# Patient Record
Sex: MALE | Race: WHITE | NOT HISPANIC OR LATINO | URBAN - METROPOLITAN AREA
[De-identification: names, ages, dates, MRNs, and addresses within clinical notes are randomized per-mention and may not be internally consistent; named-entity substitution may affect disease eponyms.]

---

## 2017-02-14 ENCOUNTER — OFFICE VISIT (OUTPATIENT)
Dept: RETAIL CLINIC | Facility: CLINIC | Age: 47
End: 2017-02-14

## 2017-02-14 VITALS
WEIGHT: 168 LBS | OXYGEN SATURATION: 99 % | HEART RATE: 76 BPM | DIASTOLIC BLOOD PRESSURE: 84 MMHG | RESPIRATION RATE: 14 BRPM | SYSTOLIC BLOOD PRESSURE: 122 MMHG | TEMPERATURE: 99.4 F

## 2017-02-14 DIAGNOSIS — J02.9 PHARYNGITIS, UNSPECIFIED ETIOLOGY: ICD-10-CM

## 2017-02-14 DIAGNOSIS — J11.1 INFLUENZA: Primary | ICD-10-CM

## 2017-02-14 LAB
EXPIRATION DATE: NORMAL
INTERNAL CONTROL: NORMAL
Lab: NORMAL
S PYO AG THROAT QL: NEGATIVE

## 2017-02-14 PROCEDURE — 87880 STREP A ASSAY W/OPTIC: CPT | Performed by: FAMILY MEDICINE

## 2017-02-14 PROCEDURE — 99213 OFFICE O/P EST LOW 20 MIN: CPT | Performed by: FAMILY MEDICINE

## 2017-02-14 RX ORDER — OMEPRAZOLE 20 MG/1
20 CAPSULE, DELAYED RELEASE ORAL DAILY
COMMUNITY
End: 2021-08-27

## 2017-02-14 NOTE — PROGRESS NOTES
"Subjective   Marcelino Ram is a 46 y.o. male presents for   Chief Complaint   Patient presents with   • Fever     RESOLVED LASTED 5 DAYS   • URI     6 DAYS   .     History of Present Illness    Marcelino started feeling sick last Wednesday (6 days ago).  He had body aches, chills, sweats, fever to 102, congestion, coughing, malaise, and fatigue.  \"I felt like I got run over by a truck.\"  He did not get a flu shot this year.  He did take some OTC ibuprofen and tylenol and that made him feel a little better.  Today is the first day he has not had a fever and felt a little better.  He says that his throat is still very sore and he was unable to swallow this morning because it hurt so bad.  That is feeling a little better now though.  Wife has also started to exhibit some of the same symptoms.  He believes that she did not get the flu shot either.    The following portions of the patient's history were reviewed and updated as appropriate: allergies, current medications, past family history, past medical history, past social history, past surgical history and problem list.    Review of Systems   Constitutional: Positive for activity change (decreased activity due to fatigue and malaise), chills, diaphoresis, fatigue and fever. Negative for appetite change and unexpected weight change.   HENT: Positive for congestion, postnasal drip, rhinorrhea, sinus pressure, sore throat and trouble swallowing (due to throat pain). Negative for drooling, ear discharge, ear pain, facial swelling, hearing loss, mouth sores, nosebleeds, sneezing and tinnitus.    Eyes: Negative.    Respiratory: Positive for cough. Negative for apnea, choking, chest tightness, shortness of breath, wheezing and stridor.    Cardiovascular: Negative.    Gastrointestinal: Negative.    Endocrine: Negative.    Genitourinary: Negative.    Musculoskeletal: Positive for arthralgias, back pain, myalgias and neck pain. Negative for gait problem, joint swelling and neck " stiffness.   Skin: Negative.    Allergic/Immunologic: Negative.    Neurological: Negative.    Hematological: Negative.    Psychiatric/Behavioral: Negative.        Objective   Vitals:    02/14/17 1034   BP: 122/84   Pulse: 76   Resp: 14   Temp: 99.4 °F (37.4 °C)   TempSrc: Oral   SpO2: 99%   Weight: 168 lb (76.2 kg)       Physical Exam   Constitutional: He is oriented to person, place, and time. He appears well-developed and well-nourished. No distress.   HENT:   Head: Normocephalic and atraumatic.   Right Ear: External ear normal.   Left Ear: External ear normal.   Nose: Nose normal.   Eyes: Conjunctivae and EOM are normal. Pupils are equal, round, and reactive to light. No scleral icterus.   Neck: Neck supple. No thyromegaly present.   Cardiovascular: Normal rate, regular rhythm, normal heart sounds and intact distal pulses.  Exam reveals no gallop and no friction rub.    No murmur heard.  Pulmonary/Chest: Effort normal and breath sounds normal. No respiratory distress. He has no wheezes. He has no rales.   Lymphadenopathy:     He has cervical adenopathy.   Neurological: He is alert and oriented to person, place, and time. No cranial nerve deficit.   Skin: Skin is warm and dry. No rash noted.   Psychiatric: He has a normal mood and affect. His behavior is normal.   Nursing note and vitals reviewed.    Lab Results   Component Value Date    RAPSCRN Negative 02/14/2017         Assessment/Plan   Marcelino was seen today for fever and uri.    Diagnoses and all orders for this visit:    Influenza   Symptom based.  Symptoms are resolving now.      Pharyngitis, unspecified etiology  -     POC Rapid Strep A-neg.  Likely due to drainage from recent flu.  Gave Mucinex 600 mg 1-2 po BID prn drainage.  Advised to increase water intake also.

## 2018-01-16 ENCOUNTER — OFFICE VISIT CONVERTED (OUTPATIENT)
Dept: SURGERY | Facility: CLINIC | Age: 48
End: 2018-01-16
Attending: NURSE PRACTITIONER

## 2018-02-28 ENCOUNTER — OFFICE VISIT CONVERTED (OUTPATIENT)
Dept: SURGERY | Facility: CLINIC | Age: 48
End: 2018-02-28
Attending: SURGERY

## 2019-01-07 ENCOUNTER — HOSPITAL ENCOUNTER (OUTPATIENT)
Dept: LAB | Facility: HOSPITAL | Age: 49
Discharge: HOME | End: 2019-01-07
Attending: PHYSICIAN ASSISTANT

## 2019-01-07 LAB
ALBUMIN SERPL-MCNC: 4.4 G/DL (ref 3.5–5)
ALBUMIN/GLOB SERPL: 1.6 {RATIO} (ref 1.4–2.6)
ALP SERPL-CCNC: 58 U/L (ref 53–128)
ALT SERPL-CCNC: 16 U/L (ref 10–40)
ANION GAP SERPL CALC-SCNC: 18 MMOL/L (ref 8–19)
AST SERPL-CCNC: 18 U/L (ref 15–50)
BASOPHILS # BLD AUTO: 0 10*3/UL (ref 0–0.2)
BASOPHILS NFR BLD AUTO: 0.03 % (ref 0–3)
BILIRUB SERPL-MCNC: 1.25 MG/DL (ref 0.2–1.3)
BUN SERPL-MCNC: 9 MG/DL (ref 5–25)
BUN/CREAT SERPL: 10 {RATIO} (ref 6–20)
CALCIUM SERPL-MCNC: 9.1 MG/DL (ref 8.7–10.4)
CHLORIDE SERPL-SCNC: 101 MMOL/L (ref 99–111)
CHOLEST SERPL-MCNC: 166 MG/DL (ref 107–200)
CHOLEST/HDLC SERPL: 4.7 {RATIO} (ref 3–6)
CONV CO2: 25 MMOL/L (ref 22–32)
CONV TOTAL PROTEIN: 7.2 G/DL (ref 6.3–8.2)
CREAT UR-MCNC: 0.94 MG/DL (ref 0.7–1.2)
EOSINOPHIL # BLD AUTO: 0.27 10*3/UL (ref 0–0.7)
EOSINOPHIL # BLD AUTO: 4.07 % (ref 0–7)
ERYTHROCYTE [DISTWIDTH] IN BLOOD BY AUTOMATED COUNT: 11.1 % (ref 11.5–14.5)
GFR SERPLBLD BASED ON 1.73 SQ M-ARVRAT: >60 ML/MIN/{1.73_M2}
GLOBULIN UR ELPH-MCNC: 2.8 G/DL (ref 2–3.5)
GLUCOSE SERPL-MCNC: 81 MG/DL (ref 70–99)
HBA1C MFR BLD: 15.4 G/DL (ref 14–18)
HCT VFR BLD AUTO: 43.6 % (ref 42–52)
HDLC SERPL-MCNC: 35 MG/DL (ref 40–60)
LDLC SERPL CALC-MCNC: 76 MG/DL (ref 70–100)
LYMPHOCYTES # BLD AUTO: 2.35 10*3/UL (ref 1–5)
MCH RBC QN AUTO: 31.4 PG (ref 27–31)
MCHC RBC AUTO-ENTMCNC: 35.3 G/DL (ref 33–37)
MCV RBC AUTO: 88.9 FL (ref 80–96)
MONOCYTES # BLD AUTO: 0.55 10*3/UL (ref 0.2–1.2)
MONOCYTES NFR BLD AUTO: 8.21 % (ref 3–10)
NEUTROPHILS # BLD AUTO: 3.49 10*3/UL (ref 2–8)
NEUTROPHILS NFR BLD AUTO: 52.4 % (ref 30–85)
NRBC BLD AUTO-RTO: 0 % (ref 0–0.01)
OSMOLALITY SERPL CALC.SUM OF ELEC: 288 MOSM/KG (ref 273–304)
PLATELET # BLD AUTO: 233 10*3/UL (ref 130–400)
PMV BLD AUTO: 8.2 FL (ref 7.4–10.4)
POTASSIUM SERPL-SCNC: 4.1 MMOL/L (ref 3.5–5.3)
RBC # BLD AUTO: 4.91 10*6/UL (ref 4.7–6.1)
SODIUM SERPL-SCNC: 140 MMOL/L (ref 135–147)
T4 FREE SERPL-MCNC: 1.5 NG/DL (ref 0.9–1.8)
TRIGL SERPL-MCNC: 276 MG/DL (ref 40–150)
TSH SERPL-ACNC: 0.88 M[IU]/L (ref 0.27–4.2)
VARIANT LYMPHS NFR BLD MANUAL: 35.3 % (ref 20–45)
VLDLC SERPL-MCNC: 55 MG/DL (ref 5–37)
WBC # BLD AUTO: 6.66 10*3/UL (ref 4.8–10.8)

## 2019-01-08 LAB — 25(OH)D3 SERPL-MCNC: 25.6 NG/ML (ref 30–100)

## 2019-01-21 ENCOUNTER — HOSPITAL ENCOUNTER (OUTPATIENT)
Dept: GENERAL RADIOLOGY | Facility: HOSPITAL | Age: 49
Discharge: HOME | End: 2019-01-21
Attending: PHYSICIAN ASSISTANT

## 2019-01-28 ENCOUNTER — OFFICE VISIT CONVERTED (OUTPATIENT)
Dept: ORTHOPEDIC SURGERY | Facility: CLINIC | Age: 49
End: 2019-01-28
Attending: ORTHOPAEDIC SURGERY

## 2019-02-11 ENCOUNTER — CONVERSION ENCOUNTER (OUTPATIENT)
Dept: SURGERY | Facility: CLINIC | Age: 49
End: 2019-02-11

## 2019-02-11 ENCOUNTER — OFFICE VISIT CONVERTED (OUTPATIENT)
Dept: UROLOGY | Facility: CLINIC | Age: 49
End: 2019-02-11
Attending: UROLOGY

## 2019-02-13 ENCOUNTER — OFFICE VISIT CONVERTED (OUTPATIENT)
Dept: SURGERY | Facility: CLINIC | Age: 49
End: 2019-02-13
Attending: NURSE PRACTITIONER

## 2019-02-14 ENCOUNTER — HOSPITAL ENCOUNTER (OUTPATIENT)
Dept: GENERAL RADIOLOGY | Facility: HOSPITAL | Age: 49
Discharge: HOME | End: 2019-02-14
Attending: UROLOGY

## 2019-07-10 ENCOUNTER — HOSPITAL ENCOUNTER (OUTPATIENT)
Dept: SURGERY | Facility: HOSPITAL | Age: 49
Setting detail: HOSPITAL OUTPATIENT SURGERY
Discharge: HOME | End: 2019-07-10
Attending: SURGERY

## 2019-07-31 ENCOUNTER — OFFICE VISIT CONVERTED (OUTPATIENT)
Dept: SURGERY | Facility: CLINIC | Age: 49
End: 2019-07-31
Attending: NURSE PRACTITIONER

## 2020-01-30 ENCOUNTER — HOSPITAL ENCOUNTER (OUTPATIENT)
Dept: LAB | Facility: HOSPITAL | Age: 50
Discharge: HOME | End: 2020-01-30
Attending: PHYSICIAN ASSISTANT

## 2020-01-30 LAB
25(OH)D3 SERPL-MCNC: 26.8 NG/ML (ref 30–100)
ALBUMIN SERPL-MCNC: 4.8 G/DL (ref 3.5–5)
ALBUMIN/GLOB SERPL: 1.7 {RATIO} (ref 1.4–2.6)
ALP SERPL-CCNC: 64 U/L (ref 53–128)
ALT SERPL-CCNC: 17 U/L (ref 10–40)
ANION GAP SERPL CALC-SCNC: 25 MMOL/L (ref 8–19)
AST SERPL-CCNC: 18 U/L (ref 15–50)
BASOPHILS # BLD AUTO: 0.06 10*3/UL (ref 0–0.2)
BASOPHILS NFR BLD AUTO: 0.8 % (ref 0–3)
BILIRUB SERPL-MCNC: 1.3 MG/DL (ref 0.2–1.3)
BUN SERPL-MCNC: 15 MG/DL (ref 5–25)
BUN/CREAT SERPL: 14 {RATIO} (ref 6–20)
CALCIUM SERPL-MCNC: 10.1 MG/DL (ref 8.7–10.4)
CHLORIDE SERPL-SCNC: 100 MMOL/L (ref 99–111)
CHOLEST SERPL-MCNC: 207 MG/DL (ref 107–200)
CHOLEST/HDLC SERPL: 6.5 {RATIO} (ref 3–6)
CONV ABS IMM GRAN: 0.06 10*3/UL (ref 0–0.2)
CONV CO2: 23 MMOL/L (ref 22–32)
CONV IMMATURE GRAN: 0.8 % (ref 0–1.8)
CONV TOTAL PROTEIN: 7.7 G/DL (ref 6.3–8.2)
CREAT UR-MCNC: 1.08 MG/DL (ref 0.7–1.2)
DEPRECATED RDW RBC AUTO: 38.3 FL (ref 35.1–43.9)
EOSINOPHIL # BLD AUTO: 0.23 10*3/UL (ref 0–0.7)
EOSINOPHIL # BLD AUTO: 3.2 % (ref 0–7)
ERYTHROCYTE [DISTWIDTH] IN BLOOD BY AUTOMATED COUNT: 11.7 % (ref 11.6–14.4)
GFR SERPLBLD BASED ON 1.73 SQ M-ARVRAT: >60 ML/MIN/{1.73_M2}
GLOBULIN UR ELPH-MCNC: 2.9 G/DL (ref 2–3.5)
GLUCOSE SERPL-MCNC: 93 MG/DL (ref 70–99)
HCT VFR BLD AUTO: 48.9 % (ref 42–52)
HDLC SERPL-MCNC: 32 MG/DL (ref 40–60)
HGB BLD-MCNC: 16.6 G/DL (ref 14–18)
LDLC SERPL CALC-MCNC: 112 MG/DL (ref 70–100)
LYMPHOCYTES # BLD AUTO: 2.48 10*3/UL (ref 1–5)
LYMPHOCYTES NFR BLD AUTO: 34 % (ref 20–45)
MCH RBC QN AUTO: 30.7 PG (ref 27–31)
MCHC RBC AUTO-ENTMCNC: 33.9 G/DL (ref 33–37)
MCV RBC AUTO: 90.6 FL (ref 80–96)
MONOCYTES # BLD AUTO: 0.73 10*3/UL (ref 0.2–1.2)
MONOCYTES NFR BLD AUTO: 10 % (ref 3–10)
NEUTROPHILS # BLD AUTO: 3.74 10*3/UL (ref 2–8)
NEUTROPHILS NFR BLD AUTO: 51.2 % (ref 30–85)
NRBC CBCN: 0 % (ref 0–0.7)
OSMOLALITY SERPL CALC.SUM OF ELEC: 297 MOSM/KG (ref 273–304)
PLATELET # BLD AUTO: 234 10*3/UL (ref 130–400)
PMV BLD AUTO: 11.1 FL (ref 9.4–12.4)
POTASSIUM SERPL-SCNC: 4.7 MMOL/L (ref 3.5–5.3)
PSA SERPL-MCNC: 1.6 NG/ML (ref 0–4)
RBC # BLD AUTO: 5.4 10*6/UL (ref 4.7–6.1)
SODIUM SERPL-SCNC: 143 MMOL/L (ref 135–147)
T4 FREE SERPL-MCNC: 1.2 NG/DL (ref 0.9–1.8)
TRIGL SERPL-MCNC: 498 MG/DL (ref 40–150)
TSH SERPL-ACNC: 1.14 M[IU]/L (ref 0.27–4.2)
WBC # BLD AUTO: 7.3 10*3/UL (ref 4.8–10.8)

## 2020-02-03 ENCOUNTER — HOSPITAL ENCOUNTER (OUTPATIENT)
Dept: GENERAL RADIOLOGY | Facility: HOSPITAL | Age: 50
Discharge: HOME | End: 2020-02-03
Attending: PHYSICIAN ASSISTANT

## 2020-04-17 ENCOUNTER — HOSPITAL ENCOUNTER (OUTPATIENT)
Dept: LAB | Facility: HOSPITAL | Age: 50
Discharge: HOME | End: 2020-04-17
Attending: PHYSICIAN ASSISTANT

## 2020-04-17 LAB
ALBUMIN SERPL-MCNC: 4.5 G/DL (ref 3.5–5)
ALBUMIN/GLOB SERPL: 1.7 {RATIO} (ref 1.4–2.6)
ALP SERPL-CCNC: 45 U/L (ref 53–128)
ALT SERPL-CCNC: 18 U/L (ref 10–40)
AMYLASE SERPL-CCNC: 39 U/L (ref 30–110)
ANION GAP SERPL CALC-SCNC: 17 MMOL/L (ref 8–19)
AST SERPL-CCNC: 19 U/L (ref 15–50)
BASOPHILS # BLD AUTO: 0.05 10*3/UL (ref 0–0.2)
BASOPHILS NFR BLD AUTO: 0.8 % (ref 0–3)
BILIRUB SERPL-MCNC: 0.98 MG/DL (ref 0.2–1.3)
BUN SERPL-MCNC: 13 MG/DL (ref 5–25)
BUN/CREAT SERPL: 12 {RATIO} (ref 6–20)
CALCIUM SERPL-MCNC: 9.4 MG/DL (ref 8.7–10.4)
CHLORIDE SERPL-SCNC: 101 MMOL/L (ref 99–111)
CONV ABS IMM GRAN: 0.03 10*3/UL (ref 0–0.2)
CONV CO2: 25 MMOL/L (ref 22–32)
CONV IMMATURE GRAN: 0.5 % (ref 0–1.8)
CONV TOTAL PROTEIN: 7.1 G/DL (ref 6.3–8.2)
CREAT UR-MCNC: 1.1 MG/DL (ref 0.7–1.2)
DEPRECATED RDW RBC AUTO: 38.3 FL (ref 35.1–43.9)
EOSINOPHIL # BLD AUTO: 0.45 10*3/UL (ref 0–0.7)
EOSINOPHIL # BLD AUTO: 7 % (ref 0–7)
ERYTHROCYTE [DISTWIDTH] IN BLOOD BY AUTOMATED COUNT: 11.7 % (ref 11.6–14.4)
GFR SERPLBLD BASED ON 1.73 SQ M-ARVRAT: >60 ML/MIN/{1.73_M2}
GLOBULIN UR ELPH-MCNC: 2.6 G/DL (ref 2–3.5)
GLUCOSE SERPL-MCNC: 89 MG/DL (ref 70–99)
HCT VFR BLD AUTO: 45.7 % (ref 42–52)
HGB BLD-MCNC: 15.4 G/DL (ref 14–18)
LIPASE SERPL-CCNC: 21 U/L (ref 5–51)
LYMPHOCYTES # BLD AUTO: 2.11 10*3/UL (ref 1–5)
LYMPHOCYTES NFR BLD AUTO: 32.7 % (ref 20–45)
MCH RBC QN AUTO: 30.4 PG (ref 27–31)
MCHC RBC AUTO-ENTMCNC: 33.7 G/DL (ref 33–37)
MCV RBC AUTO: 90.1 FL (ref 80–96)
MONOCYTES # BLD AUTO: 0.57 10*3/UL (ref 0.2–1.2)
MONOCYTES NFR BLD AUTO: 8.8 % (ref 3–10)
NEUTROPHILS # BLD AUTO: 3.25 10*3/UL (ref 2–8)
NEUTROPHILS NFR BLD AUTO: 50.2 % (ref 30–85)
NRBC CBCN: 0 % (ref 0–0.7)
OSMOLALITY SERPL CALC.SUM OF ELEC: 288 MOSM/KG (ref 273–304)
PLATELET # BLD AUTO: 273 10*3/UL (ref 130–400)
PMV BLD AUTO: 11 FL (ref 9.4–12.4)
POTASSIUM SERPL-SCNC: 4.3 MMOL/L (ref 3.5–5.3)
RBC # BLD AUTO: 5.07 10*6/UL (ref 4.7–6.1)
SODIUM SERPL-SCNC: 139 MMOL/L (ref 135–147)
WBC # BLD AUTO: 6.46 10*3/UL (ref 4.8–10.8)

## 2020-04-29 ENCOUNTER — HOSPITAL ENCOUNTER (OUTPATIENT)
Dept: NUCLEAR MEDICINE | Facility: HOSPITAL | Age: 50
Discharge: HOME | End: 2020-04-29
Attending: PHYSICIAN ASSISTANT

## 2020-05-01 ENCOUNTER — HOSPITAL ENCOUNTER (OUTPATIENT)
Dept: CT IMAGING | Facility: HOSPITAL | Age: 50
Discharge: HOME | End: 2020-05-01
Attending: PHYSICIAN ASSISTANT

## 2020-07-27 ENCOUNTER — HOSPITAL ENCOUNTER (OUTPATIENT)
Dept: LAB | Facility: HOSPITAL | Age: 50
Discharge: HOME | End: 2020-07-27
Attending: PHYSICIAN ASSISTANT

## 2020-07-28 LAB
ALBUMIN SERPL-MCNC: 4.4 G/DL (ref 3.5–5)
ALBUMIN/GLOB SERPL: 1.8 {RATIO} (ref 1.4–2.6)
ALP SERPL-CCNC: 61 U/L (ref 53–128)
ALT SERPL-CCNC: 14 U/L (ref 10–40)
ANION GAP SERPL CALC-SCNC: 17 MMOL/L (ref 8–19)
AST SERPL-CCNC: 20 U/L (ref 15–50)
BILIRUB SERPL-MCNC: 1.09 MG/DL (ref 0.2–1.3)
BUN SERPL-MCNC: 10 MG/DL (ref 5–25)
BUN/CREAT SERPL: 9 {RATIO} (ref 6–20)
CALCIUM SERPL-MCNC: 9.7 MG/DL (ref 8.7–10.4)
CHLORIDE SERPL-SCNC: 102 MMOL/L (ref 99–111)
CHOLEST SERPL-MCNC: 190 MG/DL (ref 107–200)
CHOLEST/HDLC SERPL: 5 {RATIO} (ref 3–6)
CONV CO2: 25 MMOL/L (ref 22–32)
CONV TOTAL PROTEIN: 6.9 G/DL (ref 6.3–8.2)
CREAT UR-MCNC: 1.1 MG/DL (ref 0.7–1.2)
GFR SERPLBLD BASED ON 1.73 SQ M-ARVRAT: >60 ML/MIN/{1.73_M2}
GLOBULIN UR ELPH-MCNC: 2.5 G/DL (ref 2–3.5)
GLUCOSE SERPL-MCNC: 86 MG/DL (ref 70–99)
HDLC SERPL-MCNC: 38 MG/DL (ref 40–60)
LDLC SERPL CALC-MCNC: 87 MG/DL (ref 70–100)
OSMOLALITY SERPL CALC.SUM OF ELEC: 286 MOSM/KG (ref 273–304)
POTASSIUM SERPL-SCNC: 4.6 MMOL/L (ref 3.5–5.3)
SODIUM SERPL-SCNC: 139 MMOL/L (ref 135–147)
TRIGL SERPL-MCNC: 323 MG/DL (ref 40–150)
VLDLC SERPL-MCNC: 65 MG/DL (ref 5–37)

## 2020-08-13 ENCOUNTER — OFFICE VISIT CONVERTED (OUTPATIENT)
Dept: SURGERY | Facility: CLINIC | Age: 50
End: 2020-08-13
Attending: NURSE PRACTITIONER

## 2021-02-02 ENCOUNTER — HOSPITAL ENCOUNTER (OUTPATIENT)
Dept: LAB | Facility: HOSPITAL | Age: 51
Discharge: HOME | End: 2021-02-02
Attending: PHYSICIAN ASSISTANT

## 2021-02-02 LAB
25(OH)D3 SERPL-MCNC: 59 NG/ML (ref 30–100)
ALBUMIN SERPL-MCNC: 4.6 G/DL (ref 3.5–5)
ALBUMIN/GLOB SERPL: 1.6 {RATIO} (ref 1.4–2.6)
ALP SERPL-CCNC: 68 U/L (ref 53–128)
ALT SERPL-CCNC: 15 U/L (ref 10–40)
ANION GAP SERPL CALC-SCNC: 19 MMOL/L (ref 8–19)
AST SERPL-CCNC: 17 U/L (ref 15–50)
BASOPHILS # BLD AUTO: 0.05 10*3/UL (ref 0–0.2)
BASOPHILS NFR BLD AUTO: 0.8 % (ref 0–3)
BILIRUB SERPL-MCNC: 0.81 MG/DL (ref 0.2–1.3)
BUN SERPL-MCNC: 15 MG/DL (ref 5–25)
BUN/CREAT SERPL: 14 {RATIO} (ref 6–20)
CALCIUM SERPL-MCNC: 9.5 MG/DL (ref 8.7–10.4)
CHLORIDE SERPL-SCNC: 102 MMOL/L (ref 99–111)
CHOLEST SERPL-MCNC: 195 MG/DL (ref 107–200)
CHOLEST/HDLC SERPL: 5.3 {RATIO} (ref 3–6)
CONV ABS IMM GRAN: 0.06 10*3/UL (ref 0–0.2)
CONV CO2: 26 MMOL/L (ref 22–32)
CONV IMMATURE GRAN: 0.9 % (ref 0–1.8)
CONV TOTAL PROTEIN: 7.4 G/DL (ref 6.3–8.2)
CREAT UR-MCNC: 1.09 MG/DL (ref 0.7–1.2)
DEPRECATED RDW RBC AUTO: 38.2 FL (ref 35.1–43.9)
EOSINOPHIL # BLD AUTO: 0.21 10*3/UL (ref 0–0.7)
EOSINOPHIL # BLD AUTO: 3.3 % (ref 0–7)
ERYTHROCYTE [DISTWIDTH] IN BLOOD BY AUTOMATED COUNT: 11.9 % (ref 11.6–14.4)
GFR SERPLBLD BASED ON 1.73 SQ M-ARVRAT: >60 ML/MIN/{1.73_M2}
GLOBULIN UR ELPH-MCNC: 2.8 G/DL (ref 2–3.5)
GLUCOSE SERPL-MCNC: 91 MG/DL (ref 70–99)
HCT VFR BLD AUTO: 46.3 % (ref 42–52)
HDLC SERPL-MCNC: 37 MG/DL (ref 40–60)
HGB BLD-MCNC: 16.2 G/DL (ref 14–18)
LDLC SERPL CALC-MCNC: 108 MG/DL (ref 70–100)
LYMPHOCYTES # BLD AUTO: 2.05 10*3/UL (ref 1–5)
LYMPHOCYTES NFR BLD AUTO: 32 % (ref 20–45)
MCH RBC QN AUTO: 31 PG (ref 27–31)
MCHC RBC AUTO-ENTMCNC: 35 G/DL (ref 33–37)
MCV RBC AUTO: 88.5 FL (ref 80–96)
MONOCYTES # BLD AUTO: 0.58 10*3/UL (ref 0.2–1.2)
MONOCYTES NFR BLD AUTO: 9 % (ref 3–10)
NEUTROPHILS # BLD AUTO: 3.46 10*3/UL (ref 2–8)
NEUTROPHILS NFR BLD AUTO: 54 % (ref 30–85)
NRBC CBCN: 0 % (ref 0–0.7)
OSMOLALITY SERPL CALC.SUM OF ELEC: 294 MOSM/KG (ref 273–304)
PLATELET # BLD AUTO: 215 10*3/UL (ref 130–400)
PMV BLD AUTO: 11 FL (ref 9.4–12.4)
POTASSIUM SERPL-SCNC: 4.5 MMOL/L (ref 3.5–5.3)
PSA SERPL-MCNC: 1.78 NG/ML (ref 0–4)
RBC # BLD AUTO: 5.23 10*6/UL (ref 4.7–6.1)
SODIUM SERPL-SCNC: 142 MMOL/L (ref 135–147)
T4 FREE SERPL-MCNC: 1.3 NG/DL (ref 0.9–1.8)
TRIGL SERPL-MCNC: 419 MG/DL (ref 40–150)
TSH SERPL-ACNC: 1.02 M[IU]/L (ref 0.27–4.2)
WBC # BLD AUTO: 6.41 10*3/UL (ref 4.8–10.8)

## 2021-02-05 ENCOUNTER — HOSPITAL ENCOUNTER (OUTPATIENT)
Dept: CARDIOLOGY | Facility: HOSPITAL | Age: 51
Discharge: HOME | End: 2021-02-05
Attending: PHYSICIAN ASSISTANT

## 2021-03-04 ENCOUNTER — HOSPITAL ENCOUNTER (OUTPATIENT)
Dept: NUCLEAR MEDICINE | Facility: HOSPITAL | Age: 51
Discharge: HOME | End: 2021-03-04
Attending: PHYSICIAN ASSISTANT

## 2021-05-10 NOTE — H&P
History and Physical      Patient Name: Marcelino Ram   Patient ID: [id number]   Sex: Male   YOB: 1970    Primary Care Provider: Edwina Anders PA-C   Referring Provider: Edwina Anders PA-C    Visit Date: August 13, 2020    Provider: ISSAC Acosta   Location[facility]   Location Address: [address] [place] [address]  [id number]   Location Phone: [phone]          Chief Complaint  · Requesting EGD  · Orestes's Esophagus   · GERD      History Of Present Illness  The patient is a 50 year old /White male presenting to the [facility] office on a referral from Sanford Segal MD.   Marcelino Ram needs to have a diagnostic EGD.   Patient states that they have had a colonoscopy. 1 year ago   Patient currently complains of: GERD and Hx of siegel's   Patient Does not have family history of colon cancer.      Patient presents today on a referral from Dr. Sanford Segal. Patient reports GERD symptoms and a history of Siegel's esophagus. Patient denies any breakthrough symptoms at night. Denies any dysphagia or abdominal pain. Admits to taking omeprazole daily now.     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     2/2018 - EGD & Colonoscopy ([facility]): hiatal hernia; grade III esophagitis; diverticulosis; Hemorrhoids.       Past Medical History  Disease Name Date Onset Notes   Siegel esophagus --  --    Epididymal cyst --  --    GERD --  --    High blood pressure --  --    High cholesterol --  --    Hydrocele --  --    Hypertension --  --    Left Tibial Osteophyte 01/30/2019 --    Limb Swelling --  --    Reflux --  --    Seasonal allergies --  --          Past Surgical History  Procedure Name Date Notes   Colonoscopy --  --    EGD (Endoscopy) --  --    Joint Surgery --  --    Knee surgery --  right knee         Medication List  Name Date Started Instructions    metoprolol succinate 25 mg oral tablet extended release 24 hr  take 1-2 tablets by oral route daily   omeprazole 40 mg oral capsule,delayed release(/EC) 09/10/2019 take 1 capsule (40 mg) by oral route once daily before a meal for 30 days   Vitamin D3 oral  --          Allergy List  Allergen Name Date Reaction Notes   NO KNOWN DRUG ALLERGIES --  --  --          Family Medical History  Disease Name Relative/Age Notes   Heart Disease Brother/  Sister/   Sister; Brother   Cancer, Unspecified Father/   Father   - No Family History of Colorectal Cancer  --    Renal Cancer Father/   Father         Social History  Finding Status Start/Stop Quantity Notes   Alcohol Current some day --/-- --  drinks weekly; beer  6-12 drinks a week   Alcohol Use Current some day --/-- --  drinks weekly, 1 drink per day, has been drinking for 11-20 years   Caffeine Current every day --/-- --  drinks regularly; coffee and tea; 1-2 times per day   lives with spouse --  --/-- --  --    . --  --/-- --  --    Recreational Drug Use Never --/-- --  no   Second hand smoke exposure Never --/-- --  no   Tobacco Never --/-- --  never smoker  never a smoker   Working --  --/-- --  --          Review of Systems  · Constitutional  o Denies  o : fever, chills  · Eyes  o Denies  o : yellowish discoloration of eyes  · HENT  o Denies  o : difficulty swallowing  · Cardiovascular  o Denies  o : chest pain, chest pain on exertion  · Respiratory  o Denies  o : shortness of breath  · Gastrointestinal  o Admits  o : heartburn, reflux  o Denies  o : nausea, vomiting, diarrhea, constipation  · Genitourinary  o Denies  o : abnormal color of urine  · Integument  o Denies  o : rash  · Neurologic  o Denies  o : tingling or numbness  · Musculoskeletal  o Denies  o : joint pain  · Endocrine  o Denies  o : weight gain, weight loss      Vitals  Date Time BP Position Site L\R Cuff Size HR RR TEMP (F) WT  HT  BMI kg/m2 BSA m2 O2 Sat        08/13/2020 09:30 AM       " 14  178lbs 4oz 5'  8\" 27.1 1.97           Physical Examination  · Constitutional  o Appearance  o : well developed, well-nourished, patient in no apparent distress  · Head and Face  o Head  o :   § Inspection  § : atraumatic, normocephalic  o Face  o :   § Inspection  § : no facial lesions  · Eyes  o Conjunctivae  o : conjunctivae normal  o Sclerae  o : sclerae white  · Neck  o Inspection/Palpation  o : normal appearance, no masses or tenderness, trachea midline  · Respiratory  o Respiratory Effort  o : breathing unlabored  · Skin and Subcutaneous Tissue  o General Inspection  o : no lesions present, no areas of discoloration, skin turgor normal, texture normal  · Neurologic  o Mental Status Examination  o :   § Orientation  § : grossly oriented to person, place and time  § Attention  § : attention normal, concentration abilities normal  § Fund of Knowledge  § : fund of knowledge within normal limits, patient aware of current events  o Gait and Station  o : normal gait, able to stand without difficulty  · Psychiatric  o Judgement and Insight  o : judgment and insight intact  o Mood and Affect  o : mood normal, affect appropriate              Assessment  · GERD (gastroesophageal reflux disease)     530.81/K21.9  · History of Hicks's esophagus     V12.79/Z87.19    Problems Reconciled  Plan  · Medications  o Medications have been Reconciled  o Transition of Care or Provider Policy  · Instructions  o Patient refusing EGD at this time. He does not want to be COVID tested. He prefers to wait until next year for EGD. Education provided on the importance of proceeding with EGD. Patient verbalizes understanding. Will recall patient in 1 year.   o Electronically Identified Patient Education Materials Provided Electronically  · Disposition  o Call or Return if symptoms worsen or persist.            Electronically Signed by: Jadyn Damian APRN -Author on August 13, 2020 03:51:00 PM  "

## 2021-05-15 VITALS — HEIGHT: 68 IN | BODY MASS INDEX: 27.01 KG/M2 | WEIGHT: 178.25 LBS | RESPIRATION RATE: 14 BRPM

## 2021-05-15 VITALS — WEIGHT: 177.37 LBS | RESPIRATION RATE: 14 BRPM | BODY MASS INDEX: 26.88 KG/M2 | HEIGHT: 68 IN

## 2021-05-16 VITALS — OXYGEN SATURATION: 97 % | BODY MASS INDEX: 26.58 KG/M2 | HEIGHT: 68 IN | WEIGHT: 175.37 LBS | HEART RATE: 74 BPM

## 2021-05-16 VITALS — BODY MASS INDEX: 26.52 KG/M2 | RESPIRATION RATE: 18 BRPM | WEIGHT: 175 LBS | HEIGHT: 68 IN

## 2021-05-16 VITALS
BODY MASS INDEX: 26.22 KG/M2 | WEIGHT: 173 LBS | DIASTOLIC BLOOD PRESSURE: 78 MMHG | HEIGHT: 68 IN | SYSTOLIC BLOOD PRESSURE: 122 MMHG

## 2021-05-16 VITALS — BODY MASS INDEX: 26.52 KG/M2 | WEIGHT: 175 LBS | RESPIRATION RATE: 14 BRPM | HEIGHT: 68 IN

## 2021-05-16 VITALS — WEIGHT: 175 LBS | HEART RATE: 75 BPM | HEIGHT: 68 IN | BODY MASS INDEX: 26.52 KG/M2

## 2021-08-15 PROBLEM — E55.9 VITAMIN D DEFICIENCY: Status: ACTIVE | Noted: 2021-08-15

## 2021-08-15 PROBLEM — K22.719 BARRETT'S ESOPHAGUS WITH DYSPLASIA: Status: ACTIVE | Noted: 2021-08-15

## 2021-08-15 PROBLEM — I10 HYPERTENSION: Status: ACTIVE | Noted: 2021-08-15

## 2021-08-15 PROBLEM — Z00.00 WELL ADULT HEALTH CHECK: Status: ACTIVE | Noted: 2021-08-15

## 2021-08-15 PROBLEM — E78.2 MIXED HYPERLIPIDEMIA: Status: ACTIVE | Noted: 2021-08-15

## 2021-08-15 PROBLEM — J30.2 SEASONAL ALLERGIC RHINITIS: Status: ACTIVE | Noted: 2021-08-15

## 2021-08-15 PROBLEM — N50.3 EPIDIDYMAL CYST: Status: ACTIVE | Noted: 2021-08-15

## 2021-08-15 PROBLEM — M25.70 OSTEOPHYTE: Status: ACTIVE | Noted: 2019-01-30

## 2021-08-15 PROBLEM — K22.70 BARRETT ESOPHAGUS: Status: ACTIVE | Noted: 2021-08-15

## 2021-08-15 PROBLEM — N43.3 HYDROCELE: Status: ACTIVE | Noted: 2021-08-15

## 2021-08-25 NOTE — PROGRESS NOTES
"Chief Complaint  Hypertension and Follow-up (Patient states that his throat has been bothering him, but he is over due for an EGD)    Subjective          Marcelino Ram presents to [facility]     History of Present Illness     Patient is a 51-year-old male, former patient of Edwina Covarrubias, who is seeing me for the first time.  Looks like he has underlying hyperlipidemia, hypertension, as well as a history of Hicks's esophagus.  I will review any labs that may have been performed for this visit, and address any new concerns.    Review of Systems   Constitutional: Negative for appetite change, fatigue and fever.   HENT: Negative for congestion and ear pain.    Eyes: Negative for blurred vision.   Respiratory: Negative for cough, chest tightness, shortness of breath and wheezing.    Cardiovascular: Negative for chest pain, palpitations and leg swelling.   Gastrointestinal: Negative for abdominal pain.   Genitourinary: Negative for difficulty urinating, dysuria and hematuria.   Musculoskeletal: Negative for arthralgias and gait problem.   Skin: Negative for skin lesions.   Neurological: Negative for syncope, memory problem and confusion.   Psychiatric/Behavioral: Negative for self-injury and depressed mood.       Objective   Vital Signs:   /84   Pulse 78   Temp 97.7 °F (36.5 °C)   Ht 172.7 cm (68\")   Wt 83 kg (183 lb)   SpO2 98%   BMI 27.83 kg/m²           Physical Exam  Vitals and nursing note reviewed.   Constitutional:       General: He is not in acute distress.     Appearance: Normal appearance. He is not toxic-appearing.   HENT:      Head: Atraumatic.      Right Ear: External ear normal.      Left Ear: External ear normal.      Nose: Nose normal.      Mouth/Throat:      Mouth: Mucous membranes are moist.   Eyes:      General:         Right eye: No discharge.         Left eye: No discharge.      Extraocular Movements: Extraocular movements intact.      Pupils: " Pupils are equal, round, and reactive to light.   Cardiovascular:      Rate and Rhythm: Normal rate and regular rhythm.      Pulses: Normal pulses.      Heart sounds: Normal heart sounds. No murmur heard.   No gallop.    Pulmonary:      Effort: Pulmonary effort is normal. No respiratory distress.      Breath sounds: No wheezing, rhonchi or rales.   Abdominal:      General: There is no distension.      Palpations: Abdomen is soft. There is no mass.      Tenderness: There is no abdominal tenderness. There is no guarding.   Musculoskeletal:         General: No swelling or tenderness.      Cervical back: No tenderness.      Right lower leg: No edema.      Left lower leg: No edema.   Skin:     General: Skin is warm and dry.      Findings: No rash.   Neurological:      General: No focal deficit present.      Mental Status: He is alert and oriented to person, place, and time. Mental status is at baseline.      Motor: No weakness.      Gait: Gait normal.   Psychiatric:         Mood and Affect: Mood normal.         Thought Content: Thought content normal.          Result Review :   The following data was reviewed by: Chris Jean MD on 08/18/2021:                  Assessment and Plan    Diagnoses and all orders for this visit:    1. Vitamin D deficiency (Primary)  Overview:  Repeat level on return to office.  Patient is compliant with over-the-counter supplementation.    Orders:  -     Vitamin D 1,25 Dihydroxy; Future    2. Mixed hyperlipidemia  Overview:  LDL was around 100 earlier this year.  However triglycerides had trended up to 400+.  Patient has been on fenofibrate, he is run out here lately, we will refill this, and repeat labs later this year or early next.    Orders:  -     Comprehensive Metabolic Panel; Future  -     Lipid Panel; Future    3. Essential hypertension  Overview:  Blood pressure is fine on low-dose beta-blocker only.  Like he is mainly on the beta-blocker for some palpitations, history of PACs.   Continue to follow.  Continue same med.      4. Hicks's esophagus with dysplasia  Overview:  Patient is on chronic PPI for this.  His last upper endoscopy was in July 2019 by Dr. Segal.  I recommend we get him in with gastroenterology going forward from this regards at least.    Orders:  -     Ambulatory Referral to Gastroenterology    5. Well adult health check  Overview:  Preventive care exam was performed 2/2/2021.    Orders:  -     CBC & Differential; Future  -     TSH+Free T4; Future  -     Urinalysis With Culture If Indicated -; Future    6. Prostate cancer screening  -     PSA Screen; Future    Other orders  -     fenofibrate (Tricor) 145 MG tablet; Take 1 tablet by mouth Daily.  Dispense: 90 tablet; Refill: 3       VISIT 2/2/21 per Edwina:    Left shoulder pain  Abnl lesion on tongue - will show dentist again  Left knee pain  HTN   Diverticulosis with hx of diverticulitis  Vitamin D Deficiency  Hypertriglyceridemia - hasn't taken Fenofibrate x 1 month - has forgotten  Hicks's esophagus - on Protonix now, will repeat EGD in 1 year - due 7/2020  Scrotal Hydrocele/cysts - saw Dr. Berry, no further work up  Hiatal Hernia  --  --  Also sees: no one  --  --  Stress test: 7/2015 per Dr. Mejía - sending for another--->had this in 3/4/21  IMPRESSION:    1.  Normal myocardial SPECT perfusion study with exercise.    2.  No evidence of myocardial infarct or reversible ischemia.    3.  Normal left ventricular systolic function with ejection fraction of 65%      Colonoscopy: 7/2019 per Dr. 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  Patient was  given instructions and counseling regarding his condition or for health maintenance advice. Please see specific information pulled into the AVS if appropriate.

## 2021-08-27 ENCOUNTER — OFFICE VISIT (OUTPATIENT)
Dept: INTERNAL MEDICINE | Facility: CLINIC | Age: 51
End: 2021-08-27

## 2021-08-27 VITALS
BODY MASS INDEX: 27.74 KG/M2 | TEMPERATURE: 97.7 F | DIASTOLIC BLOOD PRESSURE: 84 MMHG | OXYGEN SATURATION: 98 % | HEART RATE: 78 BPM | HEIGHT: 68 IN | SYSTOLIC BLOOD PRESSURE: 131 MMHG | WEIGHT: 183 LBS

## 2021-08-27 DIAGNOSIS — E78.2 MIXED HYPERLIPIDEMIA: ICD-10-CM

## 2021-08-27 DIAGNOSIS — K22.719 BARRETT'S ESOPHAGUS WITH DYSPLASIA: ICD-10-CM

## 2021-08-27 DIAGNOSIS — Z12.5 PROSTATE CANCER SCREENING: ICD-10-CM

## 2021-08-27 DIAGNOSIS — E55.9 VITAMIN D DEFICIENCY: Primary | ICD-10-CM

## 2021-08-27 DIAGNOSIS — I10 ESSENTIAL HYPERTENSION: ICD-10-CM

## 2021-08-27 DIAGNOSIS — Z00.00 WELL ADULT HEALTH CHECK: ICD-10-CM

## 2021-08-27 PROCEDURE — 99214 OFFICE O/P EST MOD 30 MIN: CPT | Performed by: INTERNAL MEDICINE

## 2021-08-27 RX ORDER — OMEPRAZOLE 40 MG/1
40 CAPSULE, DELAYED RELEASE ORAL DAILY
Qty: 90 CAPSULE | Refills: 1 | Status: SHIPPED | OUTPATIENT
Start: 2021-08-27 | End: 2021-09-29 | Stop reason: SDUPTHER

## 2021-08-27 RX ORDER — FENOFIBRATE 145 MG/1
145 TABLET, COATED ORAL DAILY
Qty: 90 TABLET | Refills: 3 | Status: SHIPPED | OUTPATIENT
Start: 2021-08-27 | End: 2022-03-01 | Stop reason: SDUPTHER

## 2021-08-27 RX ORDER — OMEPRAZOLE 40 MG/1
40 CAPSULE, DELAYED RELEASE ORAL DAILY
COMMUNITY
End: 2021-08-27 | Stop reason: SDUPTHER

## 2021-09-10 ENCOUNTER — TELEPHONE (OUTPATIENT)
Dept: INTERNAL MEDICINE | Facility: CLINIC | Age: 51
End: 2021-09-10

## 2021-09-10 RX ORDER — METOPROLOL SUCCINATE 25 MG/1
25 TABLET, EXTENDED RELEASE ORAL DAILY
Qty: 90 TABLET | Refills: 1 | Status: SHIPPED | OUTPATIENT
Start: 2021-09-10 | End: 2022-09-08 | Stop reason: SDUPTHER

## 2021-09-10 NOTE — TELEPHONE ENCOUNTER
Pt states that you sent in Metoprolol 25mg ER Tartrate, he had always gotten the ER succinate. He wanted to make sure that this change was ok. Please advise.

## 2021-09-10 NOTE — TELEPHONE ENCOUNTER
Please let the patient know that the metoprolol succinate was sent over to his pharmacy.  The metoprolol tartrate must have been what was in his chart, and I just refilled that.  We did not mean to make that change, so I agree with him staying on the metoprolol succinate.

## 2021-09-29 RX ORDER — OMEPRAZOLE 40 MG/1
40 CAPSULE, DELAYED RELEASE ORAL DAILY
Qty: 90 CAPSULE | Refills: 1 | Status: SHIPPED | OUTPATIENT
Start: 2021-09-29 | End: 2022-10-19

## 2021-11-23 NOTE — PROGRESS NOTES
Chief Complaint        Dysphagia, and Hicks's esophagus     History of Present Illness      Marcelino Ram is a 51 y.o. male who presents to [facility] as a new patient with a history of dysphagia and Hicks's esophagus.  Patient reports that his reflux is well controlled on omeprazole 40 mg.  His last EGD was in 2019 which displayed Hicks's esophagus and gastritis performed with Dr. Segal he was supposed to have a repeat EGD in 2020 but was unable to do so.  Patient reports that he feels like there is something stuck in his throat all the time.  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  He denies use of NSAIDs.  He denies family history of gastric cancer or colorectal cancer.  Patient reports no changes in his bowel movements.  Patient denies fever, nausea, vomiting, weight loss, night sweats, melena, hematochezia, hematemesis.    Endoscopy: Review of the patient's most recent EGD and colonoscopy performed by Dr. Segal on 07/10/2019 revealed Hicks's esophagus and gastritis.  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  Reflux esophagitis.  Negative for H. pylori.  Patient be due for repeat colonoscopy      Results       Result Review :   The following data was reviewed by: Kristen Simon NP on 11/29/2021     CMP    CMP 2/2/21   Glucose 91   BUN 15   Creatinine 1.09   Sodium 142   Potassium 4.5   Chloride 102   Calcium 9.5   Albumin 4.6   Total Bilirubin 0.81   Alkaline Phosphatase 68   AST (SGOT) 17   ALT (SGPT) 15           CBC    CBC 2/2/21   WBC 6.41   RBC 5.23   Hemoglobin 16.2   Hematocrit 46.3   MCV 88.5   MCH 31.0   MCHC 35.0   RDW 11.9   Platelets 215                  Past Medical History       Past Medical History:   Diagnosis Date   • Angioneurotic edema, initial encounter    • Anxiety disorder, unspecified    • APC (adenomatous  "polyposis coli)    • Hicks esophagus    • Cough    • Diverticulosis    • Esophageal reflux    • Essential (primary) hypertension    • Fracture of unsp metatarsal bone(s), left foot, init    • GERD (gastroesophageal reflux disease)    • Hyperlipidemia, unspecified    • Hypertension    • Multiple fractures of fingers    • Neural foraminal stenosis of cervical spine    • Other chest pain    • Other somatoform disorders    • PAC (premature atrial contraction)    • Pain in unspecified knee    • Palpitations    • Pneumonia    • Routine general medical examination at health care facility    • Vitamin D deficiency, unspecified        Past Surgical History:   Procedure Laterality Date   • COLONOSCOPY  07/2019   • KNEE ARTHROSCOPY Right    • NOSE SURGERY      NOSE FRACTURE   • UPPER GASTROINTESTINAL ENDOSCOPY           Current Outpatient Medications:   •  fenofibrate (Tricor) 145 MG tablet, Take 1 tablet by mouth Daily., Disp: 90 tablet, Rfl: 3  •  metoprolol succinate XL (Toprol XL) 25 MG 24 hr tablet, Take 1 tablet by mouth Daily., Disp: 90 tablet, Rfl: 1  •  omeprazole (priLOSEC) 40 MG capsule, Take 1 capsule by mouth Daily., Disp: 90 capsule, Rfl: 1     No Known Allergies    Family History   Problem Relation Age of Onset   • Cancer Father    • Heart disease Sister    • Stroke Sister    • Heart disease Brother    • Colon cancer Neg Hx         Social History     Social History Narrative   • Not on file       Objective       Objective     Vital Signs:   /77 (BP Location: Right arm, Patient Position: Sitting, Cuff Size: Adult)   Pulse 72   Ht 172.7 cm (68\")   Wt 85.5 kg (188 lb 6.4 oz)   SpO2 100%   BMI 28.65 kg/m²     Body mass index is 28.65 kg/m².    Physical Exam  Constitutional:       General: He is not in acute distress.     Appearance: Normal appearance. He is well-developed and normal weight.   Eyes:      Conjunctiva/sclera: Conjunctivae normal.      Pupils: Pupils are equal, round, and reactive to light. "      Visual Fields: Right eye visual fields normal and left eye visual fields normal.   Cardiovascular:      Rate and Rhythm: Normal rate and regular rhythm.      Heart sounds: Normal heart sounds.   Pulmonary:      Effort: Pulmonary effort is normal. No retractions.      Breath sounds: Normal breath sounds and air entry.      Comments: Inspection of chest: normal appearance  Abdominal:      General: Bowel sounds are normal.      Palpations: Abdomen is soft.      Tenderness: There is no abdominal tenderness.      Comments: No appreciable hepatosplenomegaly   Musculoskeletal:      Cervical back: Neck supple.      Right lower leg: No edema.      Left lower leg: No edema.   Lymphadenopathy:      Cervical: No cervical adenopathy.   Skin:     Findings: No lesion.      Comments: Turgor normal   Neurological:      Mental Status: He is alert and oriented to person, place, and time.   Psychiatric:         Mood and Affect: Mood and affect normal.              Assessment & Plan          Assessment and Plan    Diagnoses and all orders for this visit:    1. Gastroesophageal reflux disease, unspecified whether esophagitis present (Primary)    2. Hicks's esophagus without dysplasia    3. Oropharyngeal dysphagia    4. Diverticulosis      51-year-old male presenting to the office today as a new patient with a history of Hicks's esophagus, dysphagia, reflux and history of diverticulosis.  I have recommended that the patient undergo further evaluation with an EGD.  I have discussed this procedure in detail with the patient.  I have discussed the risks, benefits and alternatives.  I have discussed the risk of anesthesia, bleeding and perforation.  Patient understands these risks, benefits and alternatives and wishes to proceed.  I will schedule him at his earliest convenience.  Patient will continue omeprazole 40 mg daily for his reflux.  I have educated him to add Metamucil due to his history of diverticulosis.  He will be due for  repeat colonoscopy in 2029.  Patient agreeable to this plan will call with any questions or concerns.            Follow Up       Follow Up   Return for Follow up after endoscopy in office.  Patient was given instructions and counseling regarding his condition or for health maintenance advice. Please see specific information pulled into the AVS if appropriate.

## 2021-11-29 ENCOUNTER — PREP FOR SURGERY (OUTPATIENT)
Dept: OTHER | Facility: HOSPITAL | Age: 51
End: 2021-11-29

## 2021-11-29 ENCOUNTER — OFFICE VISIT (OUTPATIENT)
Dept: GASTROENTEROLOGY | Facility: CLINIC | Age: 51
End: 2021-11-29

## 2021-11-29 VITALS
WEIGHT: 188.4 LBS | HEIGHT: 68 IN | DIASTOLIC BLOOD PRESSURE: 77 MMHG | BODY MASS INDEX: 28.55 KG/M2 | SYSTOLIC BLOOD PRESSURE: 135 MMHG | HEART RATE: 72 BPM | OXYGEN SATURATION: 100 %

## 2021-11-29 DIAGNOSIS — R13.12 OROPHARYNGEAL DYSPHAGIA: ICD-10-CM

## 2021-11-29 DIAGNOSIS — K22.70 BARRETT'S ESOPHAGUS WITHOUT DYSPLASIA: ICD-10-CM

## 2021-11-29 DIAGNOSIS — K57.90 DIVERTICULOSIS: ICD-10-CM

## 2021-11-29 DIAGNOSIS — K21.9 GASTROESOPHAGEAL REFLUX DISEASE, UNSPECIFIED WHETHER ESOPHAGITIS PRESENT: Primary | ICD-10-CM

## 2021-11-29 PROCEDURE — 99204 OFFICE O/P NEW MOD 45 MIN: CPT | Performed by: NURSE PRACTITIONER

## 2021-11-29 NOTE — PATIENT INSTRUCTIONS
Hicks's Esophagus    Hicks's esophagus occurs when the tissue that lines the esophagus changes or becomes damaged. The esophagus is the tube that carries food from the throat to the stomach. With Hicks's esophagus, the cells that line the esophagus are replaced by cells that are similar to the lining of the intestines (intestinal metaplasia).  Hicks's esophagus itself may not cause any symptoms. However, many people who have Hicks's esophagus also have gastroesophageal reflux disease (GERD), which may cause symptoms such as heartburn. Over time, a few people with this condition may develop cancer of the esophagus. Treatment may include medicines, procedures to destroy the abnormal cells, or surgery.  What are the causes?  The exact cause of this condition is not known. In some cases, the condition develops from damage to the lining of the esophagus caused by GERD. GERD occurs when stomach acids flow up from the stomach into the esophagus. Frequent symptoms of GERD may cause intestinal metaplasia or cause cell changes (dysplasia).  What increases the risk?  You are more likely to develop this condition if you:  · Have GERD.  · Are male.  · Are .  · Are obese.  · Are older than 50.  · Have a hiatal hernia. This is a condition in which part of your stomach bulges into your chest.  · Smoke.  What are the signs or symptoms?  People with Hicks's esophagus often have no symptoms. However, many people with this condition also have GERD. Symptoms of GERD may include:  · Heartburn.  · Difficulty swallowing.  · Dry cough.  How is this diagnosed?  This condition may be diagnosed based on:  · Results of an upper gastrointestinal endoscopy. For this exam, a thin, flexible tube with a light and a camera on the end (endoscope) is passed down your esophagus. Your health care provider can view the inside of your esophagus during this procedure.  · Results of a biopsy. For this procedure, several tissue samples  are removed (biopsy) from your esophagus. They are then checked for intestinal metaplasia or dysplasia.  How is this treated?  Treatment for this condition may include:  1. Medicines (proton pump inhibitors, or PPIs) to decrease or stop GERD.  2. Periodic endoscopic exams to make sure that cancer is not developing.  3. A procedure or surgery for dysplasia. This may include:  ? Removal or destruction of abnormal cells.  ? Removal of part of the esophagus (esophagectomy).  Follow these instructions at home:  Eating and drinking  1. Eat more fruits and vegetables.  2. Avoid fatty foods.  3. Eat small, frequent meals instead of large meals.  4. Avoid foods that cause heartburn. These foods include:  ? Coffee and alcoholic drinks.  ? Tomatoes and foods made with tomatoes.  ? Greasy or spicy foods.  ? Chocolate and peppermint.  5. Do not drink alcohol.  General instructions  · Take over-the-counter and prescription medicines only as told by your health care provider.  · Do not use any products that contain nicotine or tobacco, such as cigarettes and e-cigarettes. If you need help quitting, ask your health care provider.  · If you are being treated for GERD, make sure you take medicines and follow all instructions as told by your health care provider.  · Keep all follow-up visits as told by your health care provider. This is important.  Contact a health care provider if:  · You have heartburn or GERD symptoms.  · You have difficulty swallowing.  Get help right away if:  · You have chest pain.  · You are unable to swallow.  · You vomit blood or material that looks like coffee grounds.  · Your stool (feces) is bright red or dark.  Summary  · Hicks's esophagus occurs when the tissue that lines the esophagus changes or becomes damaged.  · Hicks's esophagus may be diagnosed with an upper gastrointestinal endoscopy and a biopsy.  · Treatment may include medicines, procedures to remove abnormal cells, or surgery.  · Follow  your health care provider's instructions about what to eat and drink, what medicines to take, and when to call for help.  This information is not intended to replace advice given to you by your health care provider. Make sure you discuss any questions you have with your health care provider.  Document Revised: 04/15/2019 Document Reviewed: 04/15/2019  [employer] Patient Education © 2021 [employer]

## 2021-12-02 ENCOUNTER — PATIENT ROUNDING (BHMG ONLY) (OUTPATIENT)
Dept: GASTROENTEROLOGY | Facility: CLINIC | Age: 51
End: 2021-12-02

## 2021-12-02 NOTE — PROGRESS NOTES
December 2, 2021    Hello, may I speak with Marcelino Ram?    My name is Jihan Biggs    I am  with [facility]  [address] [place] [address] [phone].    Before we get started may I verify your date of birth? 1970    I am calling to officially welcome you to our practice and ask about your recent visit. Is this a good time to talk? No-voicemail left     Tell me about your visit with us. What things went well?        We're always looking for ways to make our patients' experiences even better. Do you have recommendations on ways we may improve?      Overall were you satisfied with your first visit to our practice?        I appreciate you taking the time to speak with me today. Is there anything else I can do for you?       Thank you, and have a great day.

## 2022-02-02 ENCOUNTER — ANESTHESIA EVENT (OUTPATIENT)
Dept: GASTROENTEROLOGY | Facility: HOSPITAL | Age: 52
End: 2022-02-02

## 2022-02-02 ENCOUNTER — ANESTHESIA (OUTPATIENT)
Dept: GASTROENTEROLOGY | Facility: HOSPITAL | Age: 52
End: 2022-02-02

## 2022-02-02 ENCOUNTER — HOSPITAL ENCOUNTER (OUTPATIENT)
Facility: HOSPITAL | Age: 52
Setting detail: HOSPITAL OUTPATIENT SURGERY
Discharge: HOME | End: 2022-02-02
Attending: INTERNAL MEDICINE | Admitting: INTERNAL MEDICINE

## 2022-02-02 VITALS
TEMPERATURE: 97.5 F | DIASTOLIC BLOOD PRESSURE: 89 MMHG | SYSTOLIC BLOOD PRESSURE: 117 MMHG | HEART RATE: 71 BPM | OXYGEN SATURATION: 96 % | BODY MASS INDEX: 29.1 KG/M2 | WEIGHT: 191.36 LBS | RESPIRATION RATE: 14 BRPM

## 2022-02-02 DIAGNOSIS — K22.70 BARRETT'S ESOPHAGUS WITHOUT DYSPLASIA: ICD-10-CM

## 2022-02-02 DIAGNOSIS — R13.12 OROPHARYNGEAL DYSPHAGIA: ICD-10-CM

## 2022-02-02 DIAGNOSIS — K21.9 GASTROESOPHAGEAL REFLUX DISEASE, UNSPECIFIED WHETHER ESOPHAGITIS PRESENT: ICD-10-CM

## 2022-02-02 PROCEDURE — 43239 EGD BIOPSY SINGLE/MULTIPLE: CPT | Performed by: INTERNAL MEDICINE

## 2022-02-02 PROCEDURE — 25010000002 PROPOFOL 10 MG/ML EMULSION: Performed by: NURSE ANESTHETIST, CERTIFIED REGISTERED

## 2022-02-02 PROCEDURE — 88305 TISSUE EXAM BY PATHOLOGIST: CPT | Performed by: INTERNAL MEDICINE

## 2022-02-02 RX ORDER — SODIUM CHLORIDE, SODIUM LACTATE, POTASSIUM CHLORIDE, CALCIUM CHLORIDE 600; 310; 30; 20 MG/100ML; MG/100ML; MG/100ML; MG/100ML
30 INJECTION, SOLUTION INTRAVENOUS CONTINUOUS
Status: DISCONTINUED | OUTPATIENT
Start: 2022-02-02 | End: 2022-02-02 | Stop reason: HOSPADM

## 2022-02-02 RX ORDER — LIDOCAINE HYDROCHLORIDE 20 MG/ML
INJECTION, SOLUTION INFILTRATION; PERINEURAL AS NEEDED
Status: DISCONTINUED | OUTPATIENT
Start: 2022-02-02 | End: 2022-02-02 | Stop reason: SURG

## 2022-02-02 RX ORDER — PROPOFOL 10 MG/ML
VIAL (ML) INTRAVENOUS AS NEEDED
Status: DISCONTINUED | OUTPATIENT
Start: 2022-02-02 | End: 2022-02-02 | Stop reason: SURG

## 2022-02-02 RX ADMIN — PROPOFOL 100 MG: 10 INJECTION, EMULSION INTRAVENOUS at 08:27

## 2022-02-02 RX ADMIN — PROPOFOL 200 MCG/KG/MIN: 10 INJECTION, EMULSION INTRAVENOUS at 08:27

## 2022-02-02 RX ADMIN — SODIUM CHLORIDE, POTASSIUM CHLORIDE, SODIUM LACTATE AND CALCIUM CHLORIDE 30 ML/HR: 600; 310; 30; 20 INJECTION, SOLUTION INTRAVENOUS at 07:06

## 2022-02-02 RX ADMIN — LIDOCAINE HYDROCHLORIDE 80 MG: 20 INJECTION, SOLUTION INFILTRATION; PERINEURAL at 08:27

## 2022-02-02 NOTE — ANESTHESIA POSTPROCEDURE EVALUATION
Patient: Marcelino Ram    Procedure Summary     Date: 02/02/22 Room / Location: [facility] ENDOSCOPY 2 / [facility] ENDOSCOPY    Anesthesia Start: 0824 Anesthesia Stop: 0839    Procedure: ESOPHAGOGASTRODUODENOSCOPY WITH BX (N/A ) Diagnosis:       Gastroesophageal reflux disease, unspecified whether esophagitis present      Hicks's esophagus without dysplasia      Oropharyngeal dysphagia      (Gastroesophageal reflux disease, unspecified whether esophagitis present [K21.9])      (Hicks's esophagus without dysplasia [K22.70])      (Oropharyngeal dysphagia [R13.12])    Surgeons: Marcelino Gray MD Provider: Ronnie Concepcion MD    Anesthesia Type: general ASA Status: 2          Anesthesia Type: general    Vitals  Vitals Value Taken Time   /89 02/02/22 0854   Temp 36.4 °C (97.5 °F) 02/02/22 0853   Pulse 71 02/02/22 0855   Resp 14 02/02/22 0853   SpO2 96 % 02/02/22 0855   Vitals shown include unvalidated device data.        Post Anesthesia Care and Evaluation    Patient location during evaluation: bedside  Patient participation: complete - patient participated  Level of consciousness: awake  Pain management: adequate  Airway patency: patent  Anesthetic complications: No anesthetic complications  PONV Status: none  Cardiovascular status: acceptable and stable  Respiratory status: acceptable  Hydration status: acceptable    Comments: An Anesthesiologist personally participated in the most demanding procedures (including induction and emergence if applicable) in the anesthesia plan, monitored the course of anesthesia administration at frequent intervals and remained physically present and available for immediate diagnosis and treatment of emergencies.

## 2022-02-02 NOTE — H&P
Pre Procedure History & Physical    Chief Complaint:   gerd  Hicks's  dysphagia    Subjective     HPI:   As above    Past Medical History:   Past Medical History:   Diagnosis Date   • Angioneurotic edema, initial encounter    • Anxiety disorder, unspecified    • APC (adenomatous polyposis coli)    • Hicks esophagus    • Cough    • Diverticulosis    • Esophageal reflux    • Essential (primary) hypertension    • Fracture of unsp metatarsal bone(s), left foot, init    • GERD (gastroesophageal reflux disease)    • Hyperlipidemia, unspecified    • Hypertension    • Multiple fractures of fingers    • Neural foraminal stenosis of cervical spine    • Other chest pain    • Other somatoform disorders    • PAC (premature atrial contraction)    • Pain in unspecified knee    • Palpitations    • Pneumonia    • Routine general medical examination at health care facility    • Vitamin D deficiency, unspecified        Past Surgical History:  Past Surgical History:   Procedure Laterality Date   • COLONOSCOPY  07/2019   • KNEE ARTHROSCOPY Right    • NOSE SURGERY      NOSE FRACTURE   • UPPER GASTROINTESTINAL ENDOSCOPY         Family History:  Family History   Problem Relation Age of Onset   • Cancer Father    • Heart disease Sister    • Stroke Sister    • Heart disease Brother    • Colon cancer Neg Hx        Social History:   reports that he has never smoked. He has never used smokeless tobacco. He reports current alcohol use of about 15.0 standard drinks of alcohol per week. He reports that he does not use drugs.    Medications:   Medications Prior to Admission   Medication Sig Dispense Refill Last Dose   • fenofibrate (Tricor) 145 MG tablet Take 1 tablet by mouth Daily. 90 tablet 3 2/1/2022 at Unknown time   • metoprolol succinate XL (Toprol XL) 25 MG 24 hr tablet Take 1 tablet by mouth Daily. 90 tablet 1 2/1/2022 at Unknown time   • omeprazole (priLOSEC) 40 MG capsule Take 1 capsule by mouth Daily. 90 capsule 1 2/1/2022 at  Unknown time       Allergies:  Patient has no known allergies.        Objective     Blood pressure 119/85, pulse 71, temperature 98.1 °F (36.7 °C), temperature source Temporal, resp. rate 16, weight 86.8 kg (191 lb 5.8 oz), SpO2 96 %.    Physical Exam   Constitutional: Pt is oriented to person, place, and time and well-developed, well-nourished, and in no distress.   Mouth/Throat: Oropharynx is clear and moist.   Neck: Normal range of motion.   Cardiovascular: Normal rate, regular rhythm and normal heart sounds.    Pulmonary/Chest: Effort normal and breath sounds normal.   Abdominal: Soft. Nontender  Skin: Skin is warm and dry.   Psychiatric: Mood, memory, affect and judgment normal.     Assessment/Plan     Diagnosis:  gerd  Hicks's   dysphagia    Anticipated Surgical Procedure:  egd    The risks, benefits, and alternatives of this procedure have been discussed with the patient or the responsible party- the patient understands and agrees to proceed.

## 2022-02-02 NOTE — ANESTHESIA PREPROCEDURE EVALUATION
Anesthesia Evaluation     Patient summary reviewed and Nursing notes reviewed   no history of anesthetic complications:  NPO Solid Status: > 8 hours  NPO Liquid Status: > 2 hours           Airway   Mallampati: II  TM distance: >3 FB  Neck ROM: full  No difficulty expected  Dental      Pulmonary - negative pulmonary ROS and normal exam    breath sounds clear to auscultation  Cardiovascular - normal exam  Exercise tolerance: good (4-7 METS)    Rhythm: regular  Rate: normal    (+) hypertension,       Neuro/Psych- negative ROS  GI/Hepatic/Renal/Endo    (+)  GERD,      Musculoskeletal (-) negative ROS    Abdominal    Substance History - negative use     OB/GYN negative ob/gyn ROS         Other - negative ROS                       Anesthesia Plan    ASA 2     general   total IV anesthesia    Anesthetic plan, all risks, benefits, and alternatives have been provided, discussed and informed consent has been obtained with: patient.        CODE STATUS:

## 2022-02-03 LAB
CYTO UR: NORMAL
LAB AP CASE REPORT: NORMAL
LAB AP CLINICAL INFORMATION: NORMAL
PATH REPORT.FINAL DX SPEC: NORMAL
PATH REPORT.GROSS SPEC: NORMAL

## 2022-03-01 ENCOUNTER — OFFICE VISIT (OUTPATIENT)
Dept: INTERNAL MEDICINE | Facility: CLINIC | Age: 52
End: 2022-03-01

## 2022-03-01 VITALS
TEMPERATURE: 99.1 F | SYSTOLIC BLOOD PRESSURE: 132 MMHG | OXYGEN SATURATION: 100 % | WEIGHT: 185.6 LBS | DIASTOLIC BLOOD PRESSURE: 92 MMHG | HEIGHT: 68 IN | BODY MASS INDEX: 28.13 KG/M2 | HEART RATE: 68 BPM

## 2022-03-01 DIAGNOSIS — E78.2 MIXED HYPERLIPIDEMIA: ICD-10-CM

## 2022-03-01 DIAGNOSIS — L30.9 DERMATITIS: ICD-10-CM

## 2022-03-01 DIAGNOSIS — K22.719 BARRETT'S ESOPHAGUS WITH DYSPLASIA: Primary | ICD-10-CM

## 2022-03-01 DIAGNOSIS — R09.89 FOREIGN BODY SENSATION IN THROAT: ICD-10-CM

## 2022-03-01 DIAGNOSIS — Z00.00 WELL ADULT HEALTH CHECK: ICD-10-CM

## 2022-03-01 DIAGNOSIS — I10 ESSENTIAL HYPERTENSION: ICD-10-CM

## 2022-03-01 PROCEDURE — 99396 PREV VISIT EST AGE 40-64: CPT | Performed by: INTERNAL MEDICINE

## 2022-03-01 RX ORDER — FENOFIBRATE 145 MG/1
145 TABLET, COATED ORAL DAILY
Qty: 90 TABLET | Refills: 1 | Status: SHIPPED | OUTPATIENT
Start: 2022-03-01 | End: 2022-09-26

## 2022-03-01 NOTE — ASSESSMENT & PLAN NOTE
LDL was around 100 earlier this year.  However triglycerides had trended up to 400+.  Patient has been on fenofibrate, he is run out here lately, we will refill this, and repeat labs later this year or early next---> patient is maintained on TriCor, prescription was refilled as of his 3/22 office visit. Labs are pending, asked him to call for the results..

## 2022-03-01 NOTE — ASSESSMENT & PLAN NOTE
Diastolic blood pressure mildly elevated as of his 3/22 office visit. Patient is maintained on very low-dose metoprolol, that is okay for now at least. Asked the patient to check his blood pressure maybe weekly for a while record the numbers, and call us with an average.

## 2022-03-01 NOTE — ASSESSMENT & PLAN NOTE
Patient had EGD 2/22 per Dr. Schmitt with results as noted above. He is stable on daily PPI as of his 3/22 appointment, continue same.

## 2022-03-02 ENCOUNTER — LAB (OUTPATIENT)
Dept: LAB | Facility: HOSPITAL | Age: 52
End: 2022-03-02

## 2022-03-02 DIAGNOSIS — Z00.00 WELL ADULT HEALTH CHECK: ICD-10-CM

## 2022-03-02 DIAGNOSIS — E78.2 MIXED HYPERLIPIDEMIA: ICD-10-CM

## 2022-03-02 DIAGNOSIS — E55.9 VITAMIN D DEFICIENCY: ICD-10-CM

## 2022-03-02 DIAGNOSIS — Z12.5 PROSTATE CANCER SCREENING: ICD-10-CM

## 2022-03-02 LAB
ALBUMIN SERPL-MCNC: 4.5 G/DL (ref 3.5–5.2)
ALBUMIN/GLOB SERPL: 1.7 G/DL
ALP SERPL-CCNC: 61 U/L (ref 39–117)
ALT SERPL W P-5'-P-CCNC: 27 U/L (ref 1–41)
ANION GAP SERPL CALCULATED.3IONS-SCNC: 12 MMOL/L (ref 5–15)
AST SERPL-CCNC: 20 U/L (ref 1–40)
BASOPHILS # BLD AUTO: 0.04 10*3/MM3 (ref 0–0.2)
BASOPHILS NFR BLD AUTO: 0.7 % (ref 0–1.5)
BILIRUB SERPL-MCNC: 0.5 MG/DL (ref 0–1.2)
BUN SERPL-MCNC: 19 MG/DL (ref 6–20)
BUN/CREAT SERPL: 17 (ref 7–25)
CALCIUM SPEC-SCNC: 11 MG/DL (ref 8.6–10.5)
CHLORIDE SERPL-SCNC: 104 MMOL/L (ref 98–107)
CHOLEST SERPL-MCNC: 160 MG/DL (ref 0–200)
CO2 SERPL-SCNC: 24 MMOL/L (ref 22–29)
CREAT SERPL-MCNC: 1.12 MG/DL (ref 0.76–1.27)
DEPRECATED RDW RBC AUTO: 38.3 FL (ref 37–54)
EGFRCR SERPLBLD CKD-EPI 2021: 80 ML/MIN/1.73
EOSINOPHIL # BLD AUTO: 0.19 10*3/MM3 (ref 0–0.4)
EOSINOPHIL NFR BLD AUTO: 3.3 % (ref 0.3–6.2)
ERYTHROCYTE [DISTWIDTH] IN BLOOD BY AUTOMATED COUNT: 11.9 % (ref 12.3–15.4)
GLOBULIN UR ELPH-MCNC: 2.6 GM/DL
GLUCOSE SERPL-MCNC: 97 MG/DL (ref 65–99)
HCT VFR BLD AUTO: 42.3 % (ref 37.5–51)
HCV AB SER DONR QL: NORMAL
HDLC SERPL-MCNC: 41 MG/DL (ref 40–60)
HGB BLD-MCNC: 14.9 G/DL (ref 13–17.7)
IMM GRANULOCYTES # BLD AUTO: 0.05 10*3/MM3 (ref 0–0.05)
IMM GRANULOCYTES NFR BLD AUTO: 0.9 % (ref 0–0.5)
LDLC SERPL CALC-MCNC: 98 MG/DL (ref 0–100)
LDLC/HDLC SERPL: 2.34 {RATIO}
LYMPHOCYTES # BLD AUTO: 2.47 10*3/MM3 (ref 0.7–3.1)
LYMPHOCYTES NFR BLD AUTO: 43.5 % (ref 19.6–45.3)
MCH RBC QN AUTO: 31.3 PG (ref 26.6–33)
MCHC RBC AUTO-ENTMCNC: 35.2 G/DL (ref 31.5–35.7)
MCV RBC AUTO: 88.9 FL (ref 79–97)
MONOCYTES # BLD AUTO: 0.61 10*3/MM3 (ref 0.1–0.9)
MONOCYTES NFR BLD AUTO: 10.7 % (ref 5–12)
NEUTROPHILS NFR BLD AUTO: 2.32 10*3/MM3 (ref 1.7–7)
NEUTROPHILS NFR BLD AUTO: 40.9 % (ref 42.7–76)
NRBC BLD AUTO-RTO: 0 /100 WBC (ref 0–0.2)
PLATELET # BLD AUTO: 266 10*3/MM3 (ref 140–450)
PMV BLD AUTO: 11.4 FL (ref 6–12)
POTASSIUM SERPL-SCNC: 4.3 MMOL/L (ref 3.5–5.2)
PROT SERPL-MCNC: 7.1 G/DL (ref 6–8.5)
PSA SERPL-MCNC: 1.35 NG/ML (ref 0–4)
RBC # BLD AUTO: 4.76 10*6/MM3 (ref 4.14–5.8)
SODIUM SERPL-SCNC: 140 MMOL/L (ref 136–145)
T4 FREE SERPL-MCNC: 1.49 NG/DL (ref 0.93–1.7)
TRIGL SERPL-MCNC: 116 MG/DL (ref 0–150)
TSH SERPL DL<=0.05 MIU/L-ACNC: 0.7 UIU/ML (ref 0.27–4.2)
VLDLC SERPL-MCNC: 21 MG/DL (ref 5–40)
WBC NRBC COR # BLD: 5.68 10*3/MM3 (ref 3.4–10.8)

## 2022-03-02 PROCEDURE — 82652 VIT D 1 25-DIHYDROXY: CPT

## 2022-03-02 PROCEDURE — 80061 LIPID PANEL: CPT

## 2022-03-02 PROCEDURE — G0103 PSA SCREENING: HCPCS

## 2022-03-02 PROCEDURE — 36415 COLL VENOUS BLD VENIPUNCTURE: CPT

## 2022-03-02 PROCEDURE — 80053 COMPREHEN METABOLIC PANEL: CPT

## 2022-03-02 PROCEDURE — 84443 ASSAY THYROID STIM HORMONE: CPT

## 2022-03-02 PROCEDURE — 84439 ASSAY OF FREE THYROXINE: CPT

## 2022-03-02 PROCEDURE — 85025 COMPLETE CBC W/AUTO DIFF WBC: CPT

## 2022-03-02 PROCEDURE — 86803 HEPATITIS C AB TEST: CPT

## 2022-03-04 LAB — 1,25(OH)2D SERPL-MCNC: 50.6 PG/ML (ref 19.9–79.3)

## 2022-03-31 ENCOUNTER — OFFICE VISIT (OUTPATIENT)
Dept: OTOLARYNGOLOGY | Facility: CLINIC | Age: 52
End: 2022-03-31

## 2022-03-31 VITALS — BODY MASS INDEX: 28.79 KG/M2 | TEMPERATURE: 97.4 F | HEIGHT: 68 IN | WEIGHT: 190 LBS

## 2022-03-31 DIAGNOSIS — R07.0 THROAT DISCOMFORT: ICD-10-CM

## 2022-03-31 DIAGNOSIS — K21.9 GASTROESOPHAGEAL REFLUX DISEASE, UNSPECIFIED WHETHER ESOPHAGITIS PRESENT: ICD-10-CM

## 2022-03-31 DIAGNOSIS — K14.8 TONGUE LESION: ICD-10-CM

## 2022-03-31 DIAGNOSIS — J33.9 NASAL POLYPS: Primary | ICD-10-CM

## 2022-03-31 DIAGNOSIS — J35.8 TONSIL ASYMMETRY: ICD-10-CM

## 2022-03-31 PROCEDURE — 99204 OFFICE O/P NEW MOD 45 MIN: CPT | Performed by: OTOLARYNGOLOGY

## 2022-04-06 PROCEDURE — 31575 DIAGNOSTIC LARYNGOSCOPY: CPT | Performed by: OTOLARYNGOLOGY

## 2022-04-06 NOTE — PROGRESS NOTES
Patient Name: Marcelino Ram   Visit Date: 03/31/2022   Patient ID: [id number]  Provider: Segundo Ott MD    Sex: male  Location: [facility]   YOB: 1970  Location Address: [address] [place] [address], [address],?[place]?[address]    Primary Care Provider Chris Jean MD  Location Phone: [phone]    Referring Provider: Chris Jean MD        Chief Complaint  Forign body sensation in throat  (New patient )    Subjective    History of Present Illness  Marcelino Ram is a 51 y.o. male who presents to [facility] today as a consult from Chris Jean MD.    He presents the clinic today for evaluation of throat discomfort along the right side, nasal polyps, and right sided tongue lesion.  He does have a tongue lesion has been there for quite some time, and it was previously recommended to have this removed by his dentist.  He has not had any significant issues with this, but notes that it is annoying because it is right on the bite line.    He does note some nasal congestion and allergic rhinitis issues, and has nasal polyps.  Denies any major issues with sinusitis or purulent drainage from the nose.    His major issue is several month history of right-sided throat discomfort.  He notes that it feels like there is a foreign body or something stuck in his throat.  He does have some issues with GERD and is on a daily PPI for this prescribed by Dr. Schmitt his GI doc whom he sees for Hicks's esophagus.  He denies any past significant history of recurrent tonsillitis.  Notes that his weight is stable, and overall he feels well.    Past Medical History:   Diagnosis Date   • Angioneurotic edema, initial encounter    • Anxiety disorder, unspecified    • APC (adenomatous polyposis coli)    • Hicks esophagus    • Cough    • Diverticulosis    • Esophageal reflux    • Essential (primary) hypertension    • Fracture of nasal bones 1986   • Fracture of unsp  "metatarsal bone(s), left foot, init    • GERD (gastroesophageal reflux disease)    • HL (hearing loss)    • Hyperlipidemia, unspecified    • Hypertension    • Multiple fractures of fingers    • Neural foraminal stenosis of cervical spine    • Other chest pain    • Other somatoform disorders    • PAC (premature atrial contraction)    • Pain in unspecified knee    • Palpitations    • Pneumonia    • Routine general medical examination at health care facility    • Tinnitus 2020    post covid   • Vitamin D deficiency, unspecified        Past Surgical History:   Procedure Laterality Date   • COLONOSCOPY  07/2019   • ENDOSCOPY N/A 02/02/2022    Procedure: ESOPHAGOGASTRODUODENOSCOPY WITH BX;  Surgeon: Marcelino Gray MD;  Location: [facility] ENDOSCOPY;  Service: Gastroenterology;  Laterality: N/A;  HIATAL HERNIA, FUNDIC GLAND POLYPS, MORALES'S ESOPHAGUS   • KNEE ARTHROSCOPY Right    • NOSE SURGERY      NOSE FRACTURE   • UPPER GASTROINTESTINAL ENDOSCOPY           Current Outpatient Medications:   •  fenofibrate (Tricor) 145 MG tablet, Take 1 tablet by mouth Daily., Disp: 90 tablet, Rfl: 1  •  metoprolol succinate XL (Toprol XL) 25 MG 24 hr tablet, Take 1 tablet by mouth Daily. (Patient taking differently: Take 25 mg by mouth Daily. Taking 1/2 tablet daily), Disp: 90 tablet, Rfl: 1  •  omeprazole (priLOSEC) 40 MG capsule, Take 1 capsule by mouth Daily., Disp: 90 capsule, Rfl: 1     No Known Allergies    Family History   Problem Relation Age of Onset   • Hypertension Mother    • Cancer Father    • Heart disease Sister    • Stroke Sister    • Heart disease Brother    • Colon cancer Neg Hx         Social History     Social History Narrative   • Not on file       Objective     Vital Signs:   Temp 97.4 °F (36.3 °C) (Temporal)   Ht 172.7 cm (68\")   Wt 86.2 kg (190 lb)   BMI 28.89 kg/m²       Physical Exam    Flexible laryngoscopy    Date/Time: 4/6/2022 2:21 PM  Performed by: Segundo Ott MD  Authorized by: [employer], " Segundo MANCERA MD     Procedure details:     Indications: assessment of airway and lesion      Medication:  Afrin    Instrument: flexible fiberoptic laryngoscope      Scope location: bilateral nare    Comments:      Nasolaryngoscopy reveals polyp formation along the middle turbinates and along the ostiomeatal complex on both sides.  No evidence of infection.  Tonsils appear asymmetric with the right greater than the left.  Vocal folds are normal with no lesions or masses.  Mobility is normal.        Constitutional   Appearance  · : well developed, well-nourished, alert and in no acute distress, voice clear and strong    Head  Inspection  · : no deformities or lesions  Face  Inspection  · : No facial lesions; House-Brackmann I/VI bilaterally  Palpation  · : No TMJ crepitus nor  muscle tenderness bilaterally    Eyes  Vision  Visual Fields  · : Extraocular movements are intact. No spontaneous or gaze-induced nystagmus.  Conjunctivae  · : clear  Sclerae  · : clear  Pupils and Irises  · : pupils equal, round, and reactive to light.     Ears, Nose, Mouth and Throat    Ears    External Ears  · : appearance within normal limits, no lesions present  Otoscopic Examination  · : Tympanic membrane appearance within normal limits bilaterally without perforations, well-aerated middle ears  Hearing  · : intact to conversational voice both ears  Tunning fork testing:     :    Nose    External Nose  · : appearance normal  Intranasal Exam  · : mucosa within normal limits, vestibules normal, no intranasal lesions present, septum midline, sinuses non tender to percussion  Oral Cavity    Oral Mucosa  · : oral mucosa normal without pallor or cyanosis  Lips  · : lip appearance normal  Teeth  · : normal dentition for age  Gums  · : gums pink, non-swollen, no bleeding present  Tongue  · : tongue with 8 mm granulomatous lesion along the right lateral border  Palate  · : hard palate normal, soft palate appearance normal with symmetric  mobility    Throat    Oropharynx  · : no inflammation or lesions present, tonsils asymmetric, 3+ on the right, 1+ to 2+ on the left, right tonsil is mildly tender on exam  Hypopharynx  · : appearance within normal limits, superior epiglottis within normal limits  Larynx  · : appearance within normal limits, vocal cords within normal limits, no lesions present    Neck  Inspection/Palpation  · : normal appearance, no masses or tenderness, trachea midline; thyroid size normal, nontender, no nodules or masses present on palpation    Respiratory  Respiratory Effort  · : breathing unlabored  Inspection of Chest  · : normal appearance, no retractions    Cardiovascular  Heart  · : regular rate and rhythm    Lymphatic  Neck  · : no lymphadenopathy present  Supraclavicular Nodes  · : no lymphadenopathy present  Preauricular Nodes  · : no lymphadenopathy present    Skin and Subcutaneous Tissue  General Inspection  · : Regarding face and neck - there are no rashes present, no lesions present, and no areas of discoloration    Neurologic  Cranial Nerves  · : cranial nerves II-XII are grossly intact bilaterally  Gait and Station  · : normal gait, able to stand without diffculty    Psychiatric  Judgement and Insight  · : judgment and insight intact  Mood and Affect  · : mood normal, affect appropriate          Assessment and Plan    Diagnoses and all orders for this visit:    1. Nasal polyps (Primary)  -     Case Request; Standing  -     COVID PRE-OP / PRE-PROCEDURE SCREENING ORDER (NO ISOLATION) - Swab, Nasopharynx; Future  -     Case Request  -     Flexible laryngoscopy    2. Tongue lesion  -     Case Request; Standing  -     COVID PRE-OP / PRE-PROCEDURE SCREENING ORDER (NO ISOLATION) - Swab, Nasopharynx; Future  -     Case Request    3. Throat discomfort  -     Case Request; Standing  -     COVID PRE-OP / PRE-PROCEDURE SCREENING ORDER (NO ISOLATION) - Swab, Nasopharynx; Future  -     Case Request  -     Flexible  laryngoscopy    4. Tonsil asymmetry  -     Case Request; Standing  -     COVID PRE-OP / PRE-PROCEDURE SCREENING ORDER (NO ISOLATION) - Swab, Nasopharynx; Future  -     Case Request  -     Flexible laryngoscopy    5. Gastroesophageal reflux disease, unspecified whether esophagitis present    Other orders  -     Follow Anesthesia Guidelines / Protocol; Future    Examination today revealed a granulomatous lesion along the right lateral border of the tongue.  This does bother him, and I recommended excision.  Nasal exam revealed nasal polyps on both sides.  Nasolaryngoscopy was performed and revealed polyps along the ostiomeatal complexes on both sides without any evidence of obstruction or infection.  Throat exam revealed asymmetric tonsils with the right tonsil significantly bigger than the left.  The tonsil was tender on exam.  Given his symptoms and clinical exam I did recommend proceeding to the operating room for direct laryngoscopy and panendoscopy as well as likely tonsillectomy and adenoidectomy.  I discussed numerous reasons for asymmetric tonsillitis ranging from benign asymmetric tonsils to intratonsillar tumor such as squamous cell carcinoma and the possibility of lymphoma.  We discussed the procedures at length, including the possible complications and alternatives.  I can address the polyps via endoscopic sinus surgery and remove the right-sided tongue lesion at the same day, and we discussed these procedures.  He notes that he understands, and would like to proceed.  I will make arrangements to have him scheduled for this in the near future.    Follow Up   No follow-ups on file.  Patient was given instructions and counseling regarding his condition or for health maintenance advice. Please see specific information pulled into the AVS if appropriate.

## 2022-04-07 PROBLEM — J35.8 TONSIL ASYMMETRY: Status: ACTIVE | Noted: 2022-04-07

## 2022-04-07 PROBLEM — K14.8 TONGUE LESION: Status: ACTIVE | Noted: 2022-04-07

## 2022-04-07 PROBLEM — R07.0 THROAT DISCOMFORT: Status: ACTIVE | Noted: 2022-04-07

## 2022-04-07 PROBLEM — J33.9 NASAL POLYPS: Status: ACTIVE | Noted: 2022-04-07

## 2022-04-20 ENCOUNTER — TELEPHONE (OUTPATIENT)
Dept: OTOLARYNGOLOGY | Facility: CLINIC | Age: 52
End: 2022-04-20

## 2022-04-21 RX ORDER — ASCORBIC ACID 500 MG
500 TABLET ORAL DAILY
COMMUNITY

## 2022-04-22 ENCOUNTER — ANESTHESIA EVENT (OUTPATIENT)
Dept: PERIOP | Facility: HOSPITAL | Age: 52
End: 2022-04-22

## 2022-04-25 ENCOUNTER — HOSPITAL ENCOUNTER (OUTPATIENT)
Facility: HOSPITAL | Age: 52
Setting detail: HOSPITAL OUTPATIENT SURGERY
Discharge: HOME | End: 2022-04-25
Attending: OTOLARYNGOLOGY | Admitting: OTOLARYNGOLOGY

## 2022-04-25 ENCOUNTER — ANESTHESIA (OUTPATIENT)
Dept: PERIOP | Facility: HOSPITAL | Age: 52
End: 2022-04-25

## 2022-04-25 VITALS
WEIGHT: 184.08 LBS | SYSTOLIC BLOOD PRESSURE: 140 MMHG | DIASTOLIC BLOOD PRESSURE: 96 MMHG | BODY MASS INDEX: 27.9 KG/M2 | HEART RATE: 78 BPM | HEIGHT: 68 IN | OXYGEN SATURATION: 97 % | TEMPERATURE: 97.8 F | RESPIRATION RATE: 20 BRPM

## 2022-04-25 DIAGNOSIS — J33.9 NASAL POLYPS: ICD-10-CM

## 2022-04-25 DIAGNOSIS — K14.8 TONGUE LESION: ICD-10-CM

## 2022-04-25 DIAGNOSIS — J35.8 TONSIL ASYMMETRY: ICD-10-CM

## 2022-04-25 DIAGNOSIS — R07.0 THROAT DISCOMFORT: ICD-10-CM

## 2022-04-25 LAB — QT INTERVAL: 377 MS

## 2022-04-25 PROCEDURE — 25010000002 DEXAMETHASONE PER 1 MG: Performed by: NURSE ANESTHETIST, CERTIFIED REGISTERED

## 2022-04-25 PROCEDURE — 93005 ELECTROCARDIOGRAM TRACING: CPT | Performed by: ANESTHESIOLOGY

## 2022-04-25 PROCEDURE — 31231 NASAL ENDOSCOPY DX: CPT | Performed by: OTOLARYNGOLOGY

## 2022-04-25 PROCEDURE — 41110 EXCISION OF TONGUE LESION: CPT | Performed by: OTOLARYNGOLOGY

## 2022-04-25 PROCEDURE — 31525 DX LARYNGOSCOPY EXCL NB: CPT | Performed by: OTOLARYNGOLOGY

## 2022-04-25 PROCEDURE — 25010000002 FENTANYL CITRATE (PF) 50 MCG/ML SOLUTION: Performed by: NURSE ANESTHETIST, CERTIFIED REGISTERED

## 2022-04-25 PROCEDURE — 42821 REMOVE TONSILS AND ADENOIDS: CPT | Performed by: OTOLARYNGOLOGY

## 2022-04-25 PROCEDURE — 30115 REMOVAL OF NOSE POLYP(S): CPT | Performed by: OTOLARYNGOLOGY

## 2022-04-25 PROCEDURE — 25010000002 PROPOFOL 10 MG/ML EMULSION: Performed by: NURSE ANESTHETIST, CERTIFIED REGISTERED

## 2022-04-25 PROCEDURE — 31622 DX BRONCHOSCOPE/WASH: CPT | Performed by: OTOLARYNGOLOGY

## 2022-04-25 PROCEDURE — 25010000002 ONDANSETRON PER 1 MG: Performed by: NURSE ANESTHETIST, CERTIFIED REGISTERED

## 2022-04-25 PROCEDURE — 25010000002 MIDAZOLAM PER 1 MG: Performed by: ANESTHESIOLOGY

## 2022-04-25 PROCEDURE — 93010 ELECTROCARDIOGRAM REPORT: CPT | Performed by: INTERNAL MEDICINE

## 2022-04-25 PROCEDURE — 43200 ESOPHAGOSCOPY FLEXIBLE BRUSH: CPT | Performed by: OTOLARYNGOLOGY

## 2022-04-25 PROCEDURE — 88304 TISSUE EXAM BY PATHOLOGIST: CPT | Performed by: OTOLARYNGOLOGY

## 2022-04-25 PROCEDURE — 88305 TISSUE EXAM BY PATHOLOGIST: CPT | Performed by: OTOLARYNGOLOGY

## 2022-04-25 RX ORDER — OXYMETAZOLINE HYDROCHLORIDE 0.05 G/100ML
SPRAY NASAL AS NEEDED
Status: DISCONTINUED | OUTPATIENT
Start: 2022-04-25 | End: 2022-04-25 | Stop reason: HOSPADM

## 2022-04-25 RX ORDER — ACETAMINOPHEN 500 MG
1000 TABLET ORAL ONCE
Status: COMPLETED | OUTPATIENT
Start: 2022-04-25 | End: 2022-04-25

## 2022-04-25 RX ORDER — MEPERIDINE HYDROCHLORIDE 25 MG/ML
12.5 INJECTION INTRAMUSCULAR; INTRAVENOUS; SUBCUTANEOUS
Status: DISCONTINUED | OUTPATIENT
Start: 2022-04-25 | End: 2022-04-25 | Stop reason: HOSPADM

## 2022-04-25 RX ORDER — DEXAMETHASONE SODIUM PHOSPHATE 4 MG/ML
INJECTION, SOLUTION INTRA-ARTICULAR; INTRALESIONAL; INTRAMUSCULAR; INTRAVENOUS; SOFT TISSUE AS NEEDED
Status: DISCONTINUED | OUTPATIENT
Start: 2022-04-25 | End: 2022-04-25 | Stop reason: SURG

## 2022-04-25 RX ORDER — ONDANSETRON 2 MG/ML
4 INJECTION INTRAMUSCULAR; INTRAVENOUS ONCE AS NEEDED
Status: DISCONTINUED | OUTPATIENT
Start: 2022-04-25 | End: 2022-04-25 | Stop reason: HOSPADM

## 2022-04-25 RX ORDER — ONDANSETRON 2 MG/ML
INJECTION INTRAMUSCULAR; INTRAVENOUS AS NEEDED
Status: DISCONTINUED | OUTPATIENT
Start: 2022-04-25 | End: 2022-04-25 | Stop reason: SURG

## 2022-04-25 RX ORDER — LIDOCAINE HYDROCHLORIDE AND EPINEPHRINE 10; 10 MG/ML; UG/ML
INJECTION, SOLUTION INFILTRATION; PERINEURAL AS NEEDED
Status: DISCONTINUED | OUTPATIENT
Start: 2022-04-25 | End: 2022-04-25 | Stop reason: HOSPADM

## 2022-04-25 RX ORDER — MIDAZOLAM HYDROCHLORIDE 1 MG/ML
2 INJECTION INTRAMUSCULAR; INTRAVENOUS ONCE
Status: COMPLETED | OUTPATIENT
Start: 2022-04-25 | End: 2022-04-25

## 2022-04-25 RX ORDER — SODIUM CHLORIDE, SODIUM LACTATE, POTASSIUM CHLORIDE, CALCIUM CHLORIDE 600; 310; 30; 20 MG/100ML; MG/100ML; MG/100ML; MG/100ML
9 INJECTION, SOLUTION INTRAVENOUS CONTINUOUS PRN
Status: DISCONTINUED | OUTPATIENT
Start: 2022-04-25 | End: 2022-04-25 | Stop reason: HOSPADM

## 2022-04-25 RX ORDER — OXYCODONE HYDROCHLORIDE 5 MG/1
5 TABLET ORAL
Status: DISCONTINUED | OUTPATIENT
Start: 2022-04-25 | End: 2022-04-25 | Stop reason: HOSPADM

## 2022-04-25 RX ORDER — EPHEDRINE SULFATE 50 MG/ML
INJECTION, SOLUTION INTRAVENOUS AS NEEDED
Status: DISCONTINUED | OUTPATIENT
Start: 2022-04-25 | End: 2022-04-25 | Stop reason: SURG

## 2022-04-25 RX ORDER — FENTANYL CITRATE 50 UG/ML
INJECTION, SOLUTION INTRAMUSCULAR; INTRAVENOUS AS NEEDED
Status: DISCONTINUED | OUTPATIENT
Start: 2022-04-25 | End: 2022-04-25 | Stop reason: SURG

## 2022-04-25 RX ORDER — ROCURONIUM BROMIDE 10 MG/ML
INJECTION, SOLUTION INTRAVENOUS AS NEEDED
Status: DISCONTINUED | OUTPATIENT
Start: 2022-04-25 | End: 2022-04-25 | Stop reason: SURG

## 2022-04-25 RX ORDER — PROPOFOL 10 MG/ML
VIAL (ML) INTRAVENOUS AS NEEDED
Status: DISCONTINUED | OUTPATIENT
Start: 2022-04-25 | End: 2022-04-25 | Stop reason: SURG

## 2022-04-25 RX ORDER — LIDOCAINE HYDROCHLORIDE 20 MG/ML
INJECTION, SOLUTION EPIDURAL; INFILTRATION; INTRACAUDAL; PERINEURAL AS NEEDED
Status: DISCONTINUED | OUTPATIENT
Start: 2022-04-25 | End: 2022-04-25 | Stop reason: SURG

## 2022-04-25 RX ORDER — PROMETHAZINE HYDROCHLORIDE 12.5 MG/1
25 TABLET ORAL ONCE AS NEEDED
Status: DISCONTINUED | OUTPATIENT
Start: 2022-04-25 | End: 2022-04-25 | Stop reason: HOSPADM

## 2022-04-25 RX ORDER — MAGNESIUM HYDROXIDE 1200 MG/15ML
LIQUID ORAL AS NEEDED
Status: DISCONTINUED | OUTPATIENT
Start: 2022-04-25 | End: 2022-04-25 | Stop reason: HOSPADM

## 2022-04-25 RX ORDER — PROMETHAZINE HYDROCHLORIDE 25 MG/1
25 SUPPOSITORY RECTAL ONCE AS NEEDED
Status: DISCONTINUED | OUTPATIENT
Start: 2022-04-25 | End: 2022-04-25 | Stop reason: HOSPADM

## 2022-04-25 RX ORDER — HYDROCODONE BITARTRATE AND ACETAMINOPHEN 5; 325 MG/1; MG/1
1-2 TABLET ORAL EVERY 6 HOURS PRN
Qty: 30 TABLET | Refills: 0 | Status: SHIPPED | OUTPATIENT
Start: 2022-04-25 | End: 2022-09-08

## 2022-04-25 RX ADMIN — DEXAMETHASONE SODIUM PHOSPHATE 8 MG: 4 INJECTION, SOLUTION INTRA-ARTICULAR; INTRALESIONAL; INTRAMUSCULAR; INTRAVENOUS; SOFT TISSUE at 14:36

## 2022-04-25 RX ADMIN — PROPOFOL 150 MG: 10 INJECTION, EMULSION INTRAVENOUS at 14:29

## 2022-04-25 RX ADMIN — ROCURONIUM BROMIDE 50 MG: 10 INJECTION INTRAVENOUS at 14:30

## 2022-04-25 RX ADMIN — FENTANYL CITRATE 50 MCG: 50 INJECTION, SOLUTION INTRAMUSCULAR; INTRAVENOUS at 14:29

## 2022-04-25 RX ADMIN — LIDOCAINE HYDROCHLORIDE 80 MG: 20 INJECTION, SOLUTION EPIDURAL; INFILTRATION; INTRACAUDAL; PERINEURAL at 15:57

## 2022-04-25 RX ADMIN — ONDANSETRON 4 MG: 2 INJECTION INTRAMUSCULAR; INTRAVENOUS at 15:57

## 2022-04-25 RX ADMIN — FENTANYL CITRATE 50 MCG: 50 INJECTION, SOLUTION INTRAMUSCULAR; INTRAVENOUS at 14:51

## 2022-04-25 RX ADMIN — MIDAZOLAM HYDROCHLORIDE 2 MG: 1 INJECTION, SOLUTION INTRAMUSCULAR; INTRAVENOUS at 14:21

## 2022-04-25 RX ADMIN — SODIUM CHLORIDE, POTASSIUM CHLORIDE, SODIUM LACTATE AND CALCIUM CHLORIDE 9 ML/HR: 600; 310; 30; 20 INJECTION, SOLUTION INTRAVENOUS at 10:14

## 2022-04-25 RX ADMIN — OXYCODONE HYDROCHLORIDE 5 MG: 5 TABLET ORAL at 16:25

## 2022-04-25 RX ADMIN — EPHEDRINE SULFATE 5 MG: 50 INJECTION INTRAVENOUS at 15:37

## 2022-04-25 RX ADMIN — SUGAMMADEX 200 MG: 100 INJECTION, SOLUTION INTRAVENOUS at 15:59

## 2022-04-25 RX ADMIN — ACETAMINOPHEN 1000 MG: 500 TABLET ORAL at 10:14

## 2022-04-25 RX ADMIN — EPHEDRINE SULFATE 10 MG: 50 INJECTION INTRAVENOUS at 14:41

## 2022-04-25 RX ADMIN — SODIUM CHLORIDE, POTASSIUM CHLORIDE, SODIUM LACTATE AND CALCIUM CHLORIDE: 600; 310; 30; 20 INJECTION, SOLUTION INTRAVENOUS at 15:53

## 2022-04-25 RX ADMIN — LIDOCAINE HYDROCHLORIDE 80 MG: 20 INJECTION, SOLUTION EPIDURAL; INFILTRATION; INTRACAUDAL; PERINEURAL at 14:29

## 2022-04-25 RX ADMIN — EPHEDRINE SULFATE 10 MG: 50 INJECTION INTRAVENOUS at 15:12

## 2022-04-25 RX ADMIN — ROCURONIUM BROMIDE 20 MG: 10 INJECTION INTRAVENOUS at 15:15

## 2022-04-25 NOTE — ANESTHESIA POSTPROCEDURE EVALUATION
Patient: Marcelino Ram    Procedure Summary     Date: 04/25/22 Room / Location: [facility] OR  / [facility] OR [facility]    Anesthesia Start: 1427 Anesthesia Stop: 1616    Procedures:       TONSILLECTOMY AND ADENOIDECTOMY, endoscopic sinus surgery with polypectomy, excision of tongue lesion, direct laryngoscopy, esophagoscopy, bronchoscopy (N/A Throat)      ENDOSCOPIC FUNCTIONAL SINUS SURGERY (N/A Head) Diagnosis:       Nasal polyps      Tongue lesion      Throat discomfort      Tonsil asymmetry      (Nasal polyps [J33.9])      (Tongue lesion [K14.8])      (Throat discomfort [R07.0])      (Tonsil asymmetry [J35.8])    Surgeons: Segundo Ott MD Provider: Joe Lozano MD    Anesthesia Type: general ASA Status: 2          Anesthesia Type: general    Vitals  Vitals Value Taken Time   /86 04/25/22 1635   Temp 36.1 °C (97 °F) 04/25/22 1614   Pulse 83 04/25/22 1651   Resp 18 04/25/22 1635   SpO2 98 % 04/25/22 1651   Vitals shown include unvalidated device data.        Post Anesthesia Care and Evaluation    Patient location during evaluation: bedside  Patient participation: complete - patient participated  Level of consciousness: awake  Pain management: adequate  Airway patency: patent  Anesthetic complications: No anesthetic complications  PONV Status: none  Cardiovascular status: acceptable and stable  Respiratory status: acceptable  Hydration status: acceptable    Comments: An Anesthesiologist personally participated in the most demanding procedures (including induction and emergence if applicable) in the anesthesia plan, monitored the course of anesthesia administration at frequent intervals and remained physically present and available for immediate diagnosis and treatment of emergencies.

## 2022-04-25 NOTE — ANESTHESIA PREPROCEDURE EVALUATION
Anesthesia Evaluation     Patient summary reviewed and Nursing notes reviewed   no history of anesthetic complications:  NPO Solid Status: > 8 hours  NPO Liquid Status: > 2 hours           Airway   Mallampati: II  TM distance: >3 FB  Neck ROM: full  No difficulty expected  Dental      Pulmonary - negative pulmonary ROS and normal exam    breath sounds clear to auscultation  Cardiovascular - normal exam  Exercise tolerance: good (4-7 METS)    Rhythm: regular  Rate: normal    (+) hypertension, hyperlipidemia,       Neuro/Psych- negative ROS  GI/Hepatic/Renal/Endo    (+)  GERD,      Musculoskeletal (-) negative ROS    Abdominal    Substance History - negative use     OB/GYN negative ob/gyn ROS         Other - negative ROS                       Anesthesia Plan    ASA 2     general   (Patient understands anesthesia not responsible for dental damage.)  intravenous induction     Anesthetic plan, all risks, benefits, and alternatives have been provided, discussed and informed consent has been obtained with: patient.    Plan discussed with CRNA.        CODE STATUS:

## 2022-06-06 ENCOUNTER — OFFICE VISIT (OUTPATIENT)
Dept: OTOLARYNGOLOGY | Facility: CLINIC | Age: 52
End: 2022-06-06

## 2022-06-06 VITALS — TEMPERATURE: 98.1 F | WEIGHT: 193.2 LBS | BODY MASS INDEX: 29.28 KG/M2 | HEIGHT: 68 IN

## 2022-06-06 DIAGNOSIS — K14.8 TONGUE LESION: ICD-10-CM

## 2022-06-06 DIAGNOSIS — K21.9 GASTROESOPHAGEAL REFLUX DISEASE, UNSPECIFIED WHETHER ESOPHAGITIS PRESENT: ICD-10-CM

## 2022-06-06 DIAGNOSIS — J35.8 TONSIL ASYMMETRY: Primary | ICD-10-CM

## 2022-06-06 DIAGNOSIS — J33.9 NASAL POLYPS: ICD-10-CM

## 2022-06-06 PROCEDURE — 99024 POSTOP FOLLOW-UP VISIT: CPT | Performed by: NURSE PRACTITIONER

## 2022-06-06 NOTE — PROGRESS NOTES
Patient Name: Marcelino Ram   Visit Date: 06/06/2022   Patient ID: [id number]  Provider: ISSAC Jensen    Sex: male  Location: [facility]   YOB: 1970  Location Address: [address] [place] [address], [address],?[place]?[id number]    Primary Care Provider Chris Jean MD  Location Phone: [phone]    Referring Provider: No ref. provider found        Chief Complaint  6 week post op T&A, Tongue Lesion    Subjective          Marcelino Ram is a 52 y.o. male who presents to [facility] for a follow-up visit of 6-week status post tonsillectomy and adenoidectomy, right lateral tongue lesion excision and excision of nasal polyps.  He states he has been doing well since the surgery.  He still has a right-sided globus sensation and often feels like something is hanging in the back of his throat.  He is having some issues with rhinitis and postnasal drainage at this time and feels like his allergies could be playing a role in this.  He has a longstanding history for GERD but feels like his symptoms are well controlled on his PPI therapy.  He does also have a history of Hicks's esophagus.  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  Tonsils show reactive lymphoid hyperplasia, acute inflammation and actinomyces colonies.      Current Outpatient Medications on File Prior to Visit   Medication Sig   • APPLE CIDER VINEGAR PO Take 1,600 mg by mouth Daily.   • ascorbic acid (VITAMIN C) 500 MG tablet Take 500 mg by mouth Daily.   • metoprolol succinate XL (Toprol XL) 25 MG 24 hr tablet Take 1 tablet by mouth Daily. (Patient taking differently: Take 25 mg by mouth Daily. Taking 1/2 tablet daily)   • omeprazole (priLOSEC) 40 MG capsule Take 1 capsule by mouth Daily.   • VITAMIN D, CHOLECALCIFEROL, PO Take 5,000 Units by mouth Daily.   • fenofibrate (Tricor) 145 MG tablet Take 1 tablet by mouth Daily.   •  "HYDROcodone-acetaminophen (NORCO) 5-325 MG per tablet Take 1-2 tablets by mouth Every 6 (Six) Hours As Needed for Moderate Pain .     No current facility-administered medications on file prior to visit.        Social History     Tobacco Use   • Smoking status: Never Smoker   • Smokeless tobacco: Never Used   Vaping Use   • Vaping Use: Never used   Substance Use Topics   • Alcohol use: Not Currently     Comment: occasional   • Drug use: Never        Objective     Vital Signs:   Temp 98.1 °F (36.7 °C) (Temporal)   Ht 172.7 cm (68\")   Wt 87.6 kg (193 lb 3.2 oz)   BMI 29.38 kg/m²       Physical Exam  Constitutional:       General: He is not in acute distress.     Appearance: Normal appearance. He is not ill-appearing.   HENT:      Head: Normocephalic and atraumatic.      Jaw: There is normal jaw occlusion. No tenderness or pain on movement.      Salivary Glands: Right salivary gland is not diffusely enlarged or tender. Left salivary gland is not diffusely enlarged or tender.      Right Ear: Tympanic membrane, ear canal and external ear normal.      Left Ear: Tympanic membrane, ear canal and external ear normal.      Nose: Nose normal. No septal deviation.      Right Sinus: No maxillary sinus tenderness or frontal sinus tenderness.      Left Sinus: No maxillary sinus tenderness or frontal sinus tenderness.      Mouth/Throat:      Lips: Pink. No lesions.      Mouth: Mucous membranes are moist. No oral lesions.      Dentition: Normal dentition.      Tongue: No lesions.      Palate: No mass and lesions.      Pharynx: Oropharynx is clear. Uvula midline.      Tonsils: No tonsillar exudate. 0 on the right. 0 on the left.      Comments: Tonsils surgically absent with a well-healed tonsillar fossa  Eyes:      Extraocular Movements: Extraocular movements intact.      Conjunctiva/sclera: Conjunctivae normal.      Pupils: Pupils are equal, round, and reactive to light.   Neck:      Thyroid: No thyroid mass, thyromegaly or " thyroid tenderness.      Trachea: Trachea normal.   Pulmonary:      Effort: Pulmonary effort is normal. No respiratory distress.   Musculoskeletal:         General: Normal range of motion.      Cervical back: Normal range of motion and neck supple. No tenderness.   Lymphadenopathy:      Cervical: No cervical adenopathy.   Skin:     General: Skin is warm and dry.   Neurological:      General: No focal deficit present.      Mental Status: He is alert and oriented to person, place, and time.      Cranial Nerves: No cranial nerve deficit.      Motor: No weakness.      Gait: Gait normal.   Psychiatric:         Mood and Affect: Mood normal.         Behavior: Behavior normal.         Thought Content: Thought content normal.         Judgment: Judgment normal.                Result Review :               Assessment and Plan    Diagnoses and all orders for this visit:    1. Tonsil asymmetry (Primary)    2. Nasal polyps    3. Tongue lesion    4. Gastroesophageal reflux disease, unspecified whether esophagitis present    I will have him start doing a nasal saline rinse to see if this helps with any postnasal drainage, allergy or sinus symptoms that he is having.  We will plan to see him back on an as-needed basis.       Follow Up   No follow-ups on file.  Patient was given instructions and counseling regarding his condition or for health maintenance advice. Please see specific information pulled into the AVS if appropriate.     ISSAC Jensen

## 2022-08-23 ENCOUNTER — TELEPHONE (OUTPATIENT)
Dept: INTERNAL MEDICINE | Facility: CLINIC | Age: 52
End: 2022-08-23

## 2022-08-23 NOTE — TELEPHONE ENCOUNTER
PATIENTS MAILBOX IS FULL. PROVIDER IS OUT OF OFFICE ON SEPT 2ND. CURRENT APPOINTMENT NEEDS RESCHEDULED.

## 2022-09-05 PROBLEM — K21.9 GASTROESOPHAGEAL REFLUX DISEASE: Status: RESOLVED | Noted: 2021-11-29 | Resolved: 2022-09-05

## 2022-09-05 NOTE — PROGRESS NOTES
"Chief Complaint  Hypertension and Follow-up (Pt states that this is a routine appt, he didn't have labs done. He has no new issues. )    Subjective          Marcelino Ram presents to [facility]     History of Present Illness   Patient is a 52-year-old male, former patient of Edwina Covarrubias, who I saw for the first time 8/21.  He has underlying hyperlipidemia, hypertension, as well as a history of Hicks's esophagus.  He was seen 3/22 for Annual Exam and is coming in now 9/22 for routine 6-month f/u.  I will review any labs that may have been performed for this visit and address any new concerns.    Review of Systems   Constitutional: Negative for appetite change, fatigue and fever.   HENT: Negative for congestion and ear pain.    Eyes: Negative for blurred vision.   Respiratory: Negative for cough, chest tightness, shortness of breath and wheezing.    Cardiovascular: Negative for chest pain, palpitations and leg swelling.   Gastrointestinal: Negative for abdominal pain.   Genitourinary: Negative for difficulty urinating, dysuria and hematuria.   Musculoskeletal: Negative for arthralgias and gait problem.   Skin: Negative for skin lesions.   Neurological: Negative for syncope, memory problem and confusion.   Psychiatric/Behavioral: Negative for self-injury and depressed mood.       Objective   Vital Signs:   BP 99/70 (BP Location: Left arm, Patient Position: Sitting, Cuff Size: Adult)   Pulse 68   Temp 97.4 °F (36.3 °C)   Ht 172.7 cm (67.99\")   Wt 88 kg (194 lb)   SpO2 97%   BMI 29.50 kg/m²           Physical Exam  Vitals and nursing note reviewed.   Constitutional:       General: He is not in acute distress.     Appearance: Normal appearance. He is not toxic-appearing.   HENT:      Head: Atraumatic.      Right Ear: External ear normal.      Left Ear: External ear normal.      Nose: Nose normal.      Mouth/Throat:      Mouth: Mucous membranes are moist.   Eyes:      " General:         Right eye: No discharge.         Left eye: No discharge.      Extraocular Movements: Extraocular movements intact.      Pupils: Pupils are equal, round, and reactive to light.   Cardiovascular:      Rate and Rhythm: Normal rate and regular rhythm.      Pulses: Normal pulses.      Heart sounds: Normal heart sounds. No murmur heard.    No gallop.   Pulmonary:      Effort: Pulmonary effort is normal. No respiratory distress.      Breath sounds: No wheezing, rhonchi or rales.   Abdominal:      General: There is no distension.      Palpations: Abdomen is soft. There is no mass.      Tenderness: There is no abdominal tenderness. There is no guarding.   Musculoskeletal:         General: No swelling or tenderness.      Cervical back: No tenderness.      Right lower leg: No edema.      Left lower leg: No edema.   Skin:     General: Skin is warm and dry.      Findings: No rash.   Neurological:      General: No focal deficit present.      Mental Status: He is alert and oriented to person, place, and time. Mental status is at baseline.      Motor: No weakness.      Gait: Gait normal.   Psychiatric:         Mood and Affect: Mood normal.         Thought Content: Thought content normal.          Result Review :   The following data was reviewed by: Chris Jean MD on 08/18/2021:                  Assessment and Plan    Diagnoses and all orders for this visit:    1. Hicks's esophagus with dysplasia (Primary)  Overview:  EGD 2/22:    Esophageal mucosal changes consistent with long-segment Hicks's esophagus---> path with no dysplasia.    Assessment & Plan:  Patient no dysphagia no significant dyspepsia on chronic PPI as of his 9/22 office visit.  Stable to continue moderate dose omeprazole.      2. Essential hypertension  Assessment & Plan:  Blood pressure was higher than normal last office visit, this one is lower than his typical.  Said that he is probably mainly on metoprolol for palpitations in the past.  No  indication for changing at this time, continue low-dose metoprolol.    Orders:  -     Comprehensive Metabolic Panel; Future    3. Mixed hyperlipidemia  Assessment & Plan:  LDL was only 98 in 3/22, and his triglycerides were very normal as well at 116.  As noted, patient's had triglycerides in the 400 ballpark previously.  Seems stable to continue with full dose fenofibrate as of his 9/22 office visit, will repeat labs after the new year.    Orders:  -     Lipid Panel; Future    4. Vitamin D deficiency  Assessment & Plan:  Repeat level on return to office.  Patient is compliant with over-the-counter supplementation.---> Vitamin D was 50 in 3/22, so we will make no changes as of his 9/22 office visit, he will continue with moderate over-the-counter dosing.      Orders:  -     Vitamin D 25 Hydroxy; Future    5. Well adult exam  -     CBC & Differential; Future  -     TSH+Free T4; Future    6. Prostate cancer screening  -     PSA Screen; Future    Other orders  -     metoprolol succinate XL (Toprol XL) 25 MG 24 hr tablet; Take 0.5 tablets by mouth Daily. Taking 1/2 tablet daily  Dispense: 90 tablet; Refill: 1     --  --  Older notes:  VISIT 2/2/21 per Edwina:  Left shoulder pain  Abnl lesion on tongue - will show dentist again  Left knee pain  HTN   Diverticulosis with hx of diverticulitis  Vitamin D Deficiency  Hypertriglyceridemia - hasn't taken Fenofibrate x 1 month - has forgotten  Hicks's esophagus - on Protonix now, will repeat EGD in 1 year - due 7/2020  Scrotal Hydrocele/cysts - saw Dr. Berry, no further work up  Hiatal Hernia  --  --  Also sees: no one  --  --  Stress test: 7/2015 per Dr. Mejía - sending for another--->had this in 3/4/21  IMPRESSION:    1.  Normal myocardial SPECT perfusion study with exercise.    2.  No evidence of myocardial infarct or reversible ischemia.    3.  Normal left ventricular systolic function with ejection fraction of 65%      Colonoscopy: 7/2019 per Dr. 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  Patient was given instructions and counseling regarding his condition or for health maintenance advice. Please see specific information pulled into the AVS if appropriate.

## 2022-09-08 ENCOUNTER — OFFICE VISIT (OUTPATIENT)
Dept: INTERNAL MEDICINE | Facility: CLINIC | Age: 52
End: 2022-09-08

## 2022-09-08 VITALS
HEIGHT: 68 IN | HEART RATE: 68 BPM | TEMPERATURE: 97.4 F | DIASTOLIC BLOOD PRESSURE: 70 MMHG | BODY MASS INDEX: 29.4 KG/M2 | SYSTOLIC BLOOD PRESSURE: 99 MMHG | WEIGHT: 194 LBS | OXYGEN SATURATION: 97 %

## 2022-09-08 DIAGNOSIS — Z00.00 WELL ADULT EXAM: ICD-10-CM

## 2022-09-08 DIAGNOSIS — E78.2 MIXED HYPERLIPIDEMIA: ICD-10-CM

## 2022-09-08 DIAGNOSIS — K22.719 BARRETT'S ESOPHAGUS WITH DYSPLASIA: Primary | ICD-10-CM

## 2022-09-08 DIAGNOSIS — Z12.5 PROSTATE CANCER SCREENING: ICD-10-CM

## 2022-09-08 DIAGNOSIS — I10 ESSENTIAL HYPERTENSION: ICD-10-CM

## 2022-09-08 DIAGNOSIS — E55.9 VITAMIN D DEFICIENCY: ICD-10-CM

## 2022-09-08 PROCEDURE — 99214 OFFICE O/P EST MOD 30 MIN: CPT | Performed by: INTERNAL MEDICINE

## 2022-09-08 RX ORDER — METOPROLOL SUCCINATE 25 MG/1
12.5 TABLET, EXTENDED RELEASE ORAL DAILY
Qty: 90 TABLET | Refills: 1 | Status: SHIPPED | OUTPATIENT
Start: 2022-09-08 | End: 2022-09-26

## 2022-09-08 NOTE — ASSESSMENT & PLAN NOTE
LDL was only 98 in 3/22, and his triglycerides were very normal as well at 116.  As noted, patient's had triglycerides in the 400 ballpark previously.  Seems stable to continue with full dose fenofibrate as of his 9/22 office visit, will repeat labs after the new year.

## 2022-09-08 NOTE — ASSESSMENT & PLAN NOTE
Blood pressure was higher than normal last office visit, this one is lower than his typical.  Said that he is probably mainly on metoprolol for palpitations in the past.  No indication for changing at this time, continue low-dose metoprolol.

## 2022-09-08 NOTE — ASSESSMENT & PLAN NOTE
Repeat level on return to office.  Patient is compliant with over-the-counter supplementation.---> Vitamin D was 50 in 3/22, so we will make no changes as of his 9/22 office visit, he will continue with moderate over-the-counter dosing.

## 2022-09-08 NOTE — ASSESSMENT & PLAN NOTE
Patient no dysphagia no significant dyspepsia on chronic PPI as of his 9/22 office visit.  Stable to continue moderate dose omeprazole.

## 2022-09-26 RX ORDER — FENOFIBRATE 145 MG/1
TABLET, COATED ORAL
Qty: 90 TABLET | Refills: 1 | Status: SHIPPED | OUTPATIENT
Start: 2022-09-26 | End: 2022-11-04 | Stop reason: SDUPTHER

## 2022-09-26 RX ORDER — METOPROLOL SUCCINATE 25 MG/1
TABLET, EXTENDED RELEASE ORAL
Qty: 90 TABLET | Refills: 1 | Status: SHIPPED | OUTPATIENT
Start: 2022-09-26 | End: 2022-11-04 | Stop reason: SDUPTHER

## 2022-10-19 RX ORDER — OMEPRAZOLE 40 MG/1
CAPSULE, DELAYED RELEASE ORAL
Qty: 90 CAPSULE | Refills: 1 | Status: SHIPPED | OUTPATIENT
Start: 2022-10-19 | End: 2022-11-04 | Stop reason: SDUPTHER

## 2022-11-04 RX ORDER — FENOFIBRATE 145 MG/1
145 TABLET, COATED ORAL DAILY
Qty: 90 TABLET | Refills: 1 | Status: SHIPPED | OUTPATIENT
Start: 2022-11-04

## 2022-11-04 RX ORDER — METOPROLOL SUCCINATE 25 MG/1
25 TABLET, EXTENDED RELEASE ORAL DAILY
Qty: 90 TABLET | Refills: 1 | Status: SHIPPED | OUTPATIENT
Start: 2022-11-04

## 2022-11-04 RX ORDER — OMEPRAZOLE 40 MG/1
40 CAPSULE, DELAYED RELEASE ORAL DAILY
Qty: 90 CAPSULE | Refills: 1 | Status: SHIPPED | OUTPATIENT
Start: 2022-11-04

## 2023-06-29 NOTE — TELEPHONE ENCOUNTER
Caller: GURPREET ALEXANDER    Relationship: SELF    Best call back number: [phone]    Timeframe paperwork needed: ASAP    Additional notes:     PT WAS DENIED COVERAGE BY INSURANCE COMPANY FOR VISIT ON 3/31/22 AND PROCEDURE ON 4/25/22 BASED ON DENIAL FOR VISIT ON 3/31/22. THE INSURANCE COMPANY IS REQUESTING THE OFFICE CALL THEM TO DISCUSS.   PT HAS A COPY OF DENIAL LETTER IF NEEDED. PLEASE CALL PT WITH ANY ADDITIONAL QUESTIONS OR CONCERNS.     REFERENCE CODE: [id number]  CUSTOMER ID: [id number]  PHONE NUMBER: [phone] [employer]  FAX: [phone]         Addended by: ANDREA TEE on: 6/29/2023 11:57 AM     Modules accepted: Orders

## 2023-10-16 PROBLEM — M25.70 OSTEOPHYTE: Status: RESOLVED | Noted: 2019-01-30 | Resolved: 2023-10-16

## 2023-10-16 PROBLEM — N50.3 EPIDIDYMAL CYST: Status: RESOLVED | Noted: 2021-08-15 | Resolved: 2023-10-16

## 2023-10-16 PROBLEM — R13.12 OROPHARYNGEAL DYSPHAGIA: Status: RESOLVED | Noted: 2021-11-29 | Resolved: 2023-10-16

## 2023-10-16 PROBLEM — N43.3 HYDROCELE: Status: RESOLVED | Noted: 2021-08-15 | Resolved: 2023-10-16

## 2023-10-16 PROBLEM — J35.8 TONSIL ASYMMETRY: Status: RESOLVED | Noted: 2022-04-07 | Resolved: 2023-10-16

## 2023-10-16 PROBLEM — K14.8 TONGUE LESION: Status: RESOLVED | Noted: 2022-04-07 | Resolved: 2023-10-16

## 2023-10-16 PROBLEM — J33.9 NASAL POLYPS: Status: RESOLVED | Noted: 2022-04-07 | Resolved: 2023-10-16

## 2023-10-17 ENCOUNTER — OFFICE VISIT (OUTPATIENT)
Dept: INTERNAL MEDICINE | Facility: CLINIC | Age: 53
End: 2023-10-17
Payer: COMMERCIAL

## 2023-10-17 VITALS
BODY MASS INDEX: 28.43 KG/M2 | DIASTOLIC BLOOD PRESSURE: 96 MMHG | HEIGHT: 68 IN | OXYGEN SATURATION: 94 % | SYSTOLIC BLOOD PRESSURE: 136 MMHG | WEIGHT: 187.6 LBS | TEMPERATURE: 99.1 F | HEART RATE: 88 BPM

## 2023-10-17 DIAGNOSIS — M25.562 CHRONIC PAIN OF LEFT KNEE: ICD-10-CM

## 2023-10-17 DIAGNOSIS — Z12.5 PROSTATE CANCER SCREENING: ICD-10-CM

## 2023-10-17 DIAGNOSIS — M19.90 ARTHRITIS: ICD-10-CM

## 2023-10-17 DIAGNOSIS — E55.9 VITAMIN D DEFICIENCY: ICD-10-CM

## 2023-10-17 DIAGNOSIS — E78.2 MIXED HYPERLIPIDEMIA: ICD-10-CM

## 2023-10-17 DIAGNOSIS — G89.29 CHRONIC PAIN OF LEFT KNEE: ICD-10-CM

## 2023-10-17 DIAGNOSIS — I10 ESSENTIAL HYPERTENSION: ICD-10-CM

## 2023-10-17 DIAGNOSIS — Z00.00 WELL ADULT HEALTH CHECK: Primary | ICD-10-CM

## 2023-10-17 DIAGNOSIS — M65.30 TRIGGER FINGER OF RIGHT HAND, UNSPECIFIED FINGER: ICD-10-CM

## 2023-10-17 DIAGNOSIS — K22.719 BARRETT'S ESOPHAGUS WITH DYSPLASIA: ICD-10-CM

## 2023-10-17 PROBLEM — R07.0 THROAT DISCOMFORT: Status: RESOLVED | Noted: 2022-04-07 | Resolved: 2023-10-17

## 2023-10-17 RX ORDER — METOPROLOL SUCCINATE 25 MG/1
25 TABLET, EXTENDED RELEASE ORAL DAILY
Qty: 90 TABLET | Refills: 1 | Status: SHIPPED | OUTPATIENT
Start: 2023-10-17

## 2023-10-17 NOTE — ASSESSMENT & PLAN NOTE
Patient needs labs as of his 10/23 OV.  The fenofibrate fell off his list, certainly reasonable to see where he is without this.  Most recent labs from 3/22 had very normal triglyceride at 116.  We will make further recommendations after labs.

## 2023-10-17 NOTE — ASSESSMENT & PLAN NOTE
Been out of metoprolol for the past week or so, he was just on low-dose 25 mg daily.  His heart rate is fine around 88, but his blood pressure has trended up as noted.  Weight is down 7 pounds from last year.  Asked patient to call if blood pressure not improving at home back on it, and would likely use different agent at that time.

## 2023-10-17 NOTE — ASSESSMENT & PLAN NOTE
Patient stable this regards as of his 10/23 OV.  He is actually controlling his symptoms with just over-the-counter 20 mg omeprazole.  He heard from GI's office recently, discussed with him his 2-year scope is due in 2/24.  Stable to continue with low-dose over-the-counter treatments and symptoms are controlled.

## 2023-10-17 NOTE — Clinical Note
Please remind me after we see his labs that I will have to put in labs for probable 6-month follow-up.

## 2023-10-17 NOTE — PROGRESS NOTES
"Chief Complaint  Annual Exam (Pt didn't have labs.), Arthritis pain (Pt has arthritis pain in his fingers and he has two fingers that lock up. ), and Hypertension (His BP is elevated, he stated that he came to the office and tried to get his BP medicine filled and nothing happened. )    Subjective          Marcelino Ram presents to [facility]     History of Present Illness   Patient is a 53-year-old male, former patient of Edwina Covarrubias, who I saw for the first time 8/21.  He has underlying hyperlipidemia, hypertension, as well as a history of Hicks's esophagus.  He is coming in now 10/23 for Annual Exam/routine 6-month f/u.  I will review any labs that may have been performed for this visit and address any new concerns.  (--->pt 6 mo late for appt as of 10/23)    Review of Systems   Constitutional:  Negative for appetite change, fatigue and fever.   HENT:  Negative for congestion and ear pain.    Eyes:  Negative for blurred vision.   Respiratory:  Negative for cough, chest tightness, shortness of breath and wheezing.    Cardiovascular:  Negative for chest pain, palpitations and leg swelling.   Gastrointestinal:  Negative for abdominal pain.   Genitourinary:  Negative for difficulty urinating, dysuria and hematuria.   Musculoskeletal:  Negative for arthralgias and gait problem.   Skin:  Negative for skin lesions.   Neurological:  Negative for syncope, memory problem and confusion.   Psychiatric/Behavioral:  Negative for self-injury and depressed mood.        Objective   Vital Signs:   /96   Pulse 88   Temp 99.1 °F (37.3 °C) (Skin)   Ht 172.7 cm (67.99\")   Wt 85.1 kg (187 lb 9.6 oz)   SpO2 94%   BMI 28.53 kg/m²           Physical Exam  Vitals and nursing note reviewed.   Constitutional:       General: He is not in acute distress.     Appearance: Normal appearance. He is not toxic-appearing.   HENT:      Head: Atraumatic.      Right Ear: External ear normal.      " Left Ear: External ear normal.      Nose: Nose normal.      Mouth/Throat:      Mouth: Mucous membranes are moist.   Eyes:      General:         Right eye: No discharge.         Left eye: No discharge.      Extraocular Movements: Extraocular movements intact.      Pupils: Pupils are equal, round, and reactive to light.   Cardiovascular:      Rate and Rhythm: Normal rate and regular rhythm.      Pulses: Normal pulses.      Heart sounds: Normal heart sounds. No murmur heard.     No gallop.   Pulmonary:      Effort: Pulmonary effort is normal. No respiratory distress.      Breath sounds: No wheezing, rhonchi or rales.   Abdominal:      General: There is no distension.      Palpations: Abdomen is soft. There is no mass.      Tenderness: There is no abdominal tenderness. There is no guarding.   Musculoskeletal:         General: No swelling or tenderness.      Cervical back: No tenderness.      Right lower leg: No edema.      Left lower leg: No edema.   Skin:     General: Skin is warm and dry.      Findings: No rash.   Neurological:      General: No focal deficit present.      Mental Status: He is alert and oriented to person, place, and time. Mental status is at baseline.      Motor: No weakness.      Gait: Gait normal.   Psychiatric:         Mood and Affect: Mood normal.         Thought Content: Thought content normal.          Result Review :   The following data was reviewed by: Chris Jean MD on 08/18/2021:                  Assessment and Plan    Diagnoses and all orders for this visit:    1. Well adult health check (Primary)  Overview:  Preventive measures: Were reviewed with the patient at this office visit.  They included but were not limited to discussions in regards to vaccines outstanding, auto safety with seat belts and other assistive devices, fall prevention, and routine screening studies.    Exercise: No ischemia with routine yard work, golf/walking as of 10/23 OV.  Comprehensive labs: all pending as of  10/23.    Covid vaccine: No longer desired.  Other vaccines: Ditto regarding flu shot 10/23.    PSA: 1.4 (3/2/2022).  Colonoscopy: 7/19... negative... 10 years.    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    Orders:  -     CBC & Differential; Future  -     Testosterone; Future  -     TSH+Free T4; Future  -     Vitamin B12 anemia; Future  -     Folate anemia; Future    2. Mixed hyperlipidemia  Assessment & Plan:  Patient needs labs as of his 10/23 OV.  The fenofibrate fell off his list, certainly reasonable to see where he is without this.  Most recent labs from 3/22 had very normal triglyceride at 116.  We will make further recommendations after labs.    Orders:  -     Lipid Panel; Future    3. Essential hypertension  Assessment & Plan:  Been out of metoprolol for the past week or so, he was just on low-dose 25 mg daily.  His heart rate is fine around 88, but his blood pressure has trended up as noted.  Weight is down 7 pounds from last year.  Asked patient to call if blood pressure not improving at home back on it, and would likely use different agent at that time.    Orders:  -     Comprehensive Metabolic Panel; Future  -     Urinalysis With Culture If Indicated -; Future    4. Hicks's esophagus with dysplasia  Overview:  EGD 2/22:  Esophageal mucosal changes consistent with long-segment Hicks's esophagus.  ---> path with no dysplasia.    Assessment & Plan:  Patient stable this regards as of his 10/23 OV.  He is actually controlling his symptoms with just over-the-counter 20 mg omeprazole.  He heard from GI's office recently, discussed with him his 2-year scope is due in 2/24.  Stable to continue with low-dose over-the-counter treatments and symptoms are controlled.      5. Chronic pain of left  knee  Assessment & Plan:  This is been progressing and is limiting some of his activities as of his 10/23 OV.  No specific trauma/injury per se.  Having difficulties on ladder, he has slacked off on bike riding etc. because of this.  Also having some issues with some trigger fingers, recommend we get him in with Dr. Elliott in the near future for comprehensive eval.    Orders:  -     Ambulatory Referral to Orthopedic Surgery    6. Prostate cancer screening  -     PSA Screen; Future    7. Arthritis  -     Sedimentation Rate; Future  -     C-reactive protein; Future  -     Uric Acid; Future    8. Vitamin D deficiency  -     Vitamin D,25-Hydroxy; Future    9. Trigger finger of right hand, unspecified finger  -     Ambulatory Referral to Orthopedic Surgery    Other orders  -     metoprolol succinate XL (TOPROL-XL) 25 MG 24 hr tablet; Take 1 tablet by mouth Daily.  Dispense: 90 tablet; Refill: 1       BMI is >= 25 and <30. (Overweight) The following options were offered after discussion;: exercise counseling/recommendations and nutrition counseling/recommendations     --  --  Older notes:  VISIT 2/2/21 per Edwina:  Left shoulder pain  Abnl lesion on tongue - will show dentist again  Left knee pain  HTN   Diverticulosis with hx of diverticulitis  Vitamin D Deficiency  Hypertriglyceridemia - hasn't taken Fenofibrate x 1 month - has forgotten  Hicks's esophagus - on Protonix now, will repeat EGD in 1 year - due 7/2020  Scrotal Hydrocele/cysts - saw Dr. Berry, no further work up  Hiatal Hernia  --  --  Also sees: no one  --  --  Stress test: 7/2015 per Dr. Mejía - sending for another--->had this in 3/4/21  IMPRESSION:    1.  Normal myocardial SPECT perfusion study with exercise.    2.  No evidence of myocardial infarct or reversible ischemia.    3.  Normal left ventricular systolic function with ejection fraction of 65%      Colonoscopy: 7/2019 per Dr. 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  Patient was given instructions and counseling regarding his condition or for health maintenance advice. Please see specific information pulled into the AVS if appropriate.       Answers submitted by the patient for this visit:  Primary Reason for Visit (Submitted on 10/16/2023)  What is the primary reason for your visit?: Other  Other (Submitted on 10/16/2023)  Please describe your symptoms.: Feet are sore and fingers are stiff in the mornings, two of them lock up.  Also, left knee has given out on me a few times.  Have you had these symptoms before?: No  How long have you been having these symptoms?: Greater than 2 weeks  Please describe any probable cause for these symptoms. : Arthritis?

## 2023-10-17 NOTE — ASSESSMENT & PLAN NOTE
This is been progressing and is limiting some of his activities as of his 10/23 OV.  No specific trauma/injury per se.  Having difficulties on ladder, he has slacked off on bike riding etc. because of this.  Also having some issues with some trigger fingers, recommend we get him in with Dr. Elliott in the near future for comprehensive eval.

## 2023-10-20 ENCOUNTER — LAB (OUTPATIENT)
Dept: LAB | Facility: HOSPITAL | Age: 53
End: 2023-10-20
Payer: COMMERCIAL

## 2023-10-20 DIAGNOSIS — E55.9 VITAMIN D DEFICIENCY: ICD-10-CM

## 2023-10-20 DIAGNOSIS — E78.2 MIXED HYPERLIPIDEMIA: ICD-10-CM

## 2023-10-20 DIAGNOSIS — M19.90 ARTHRITIS: ICD-10-CM

## 2023-10-20 DIAGNOSIS — Z00.00 WELL ADULT HEALTH CHECK: ICD-10-CM

## 2023-10-20 DIAGNOSIS — Z12.5 PROSTATE CANCER SCREENING: ICD-10-CM

## 2023-10-20 DIAGNOSIS — I10 ESSENTIAL HYPERTENSION: ICD-10-CM

## 2023-10-20 LAB
25(OH)D3 SERPL-MCNC: 96 NG/ML (ref 30–100)
ALBUMIN SERPL-MCNC: 4.5 G/DL (ref 3.5–5.2)
ALBUMIN/GLOB SERPL: 2 G/DL
ALP SERPL-CCNC: 56 U/L (ref 39–117)
ALT SERPL W P-5'-P-CCNC: 19 U/L (ref 1–41)
ANION GAP SERPL CALCULATED.3IONS-SCNC: 8.2 MMOL/L (ref 5–15)
AST SERPL-CCNC: 23 U/L (ref 1–40)
BASOPHILS # BLD AUTO: 0.05 10*3/MM3 (ref 0–0.2)
BASOPHILS NFR BLD AUTO: 0.9 % (ref 0–1.5)
BILIRUB SERPL-MCNC: 1.3 MG/DL (ref 0–1.2)
BILIRUB UR QL STRIP: NEGATIVE
BUN SERPL-MCNC: 13 MG/DL (ref 6–20)
BUN/CREAT SERPL: 13 (ref 7–25)
CALCIUM SPEC-SCNC: 9.5 MG/DL (ref 8.6–10.5)
CHLORIDE SERPL-SCNC: 104 MMOL/L (ref 98–107)
CHOLEST SERPL-MCNC: 210 MG/DL (ref 0–200)
CLARITY UR: CLEAR
CO2 SERPL-SCNC: 27.8 MMOL/L (ref 22–29)
COLOR UR: YELLOW
CREAT SERPL-MCNC: 1 MG/DL (ref 0.76–1.27)
CRP SERPL-MCNC: <0.3 MG/DL (ref 0–0.5)
DEPRECATED RDW RBC AUTO: 38.6 FL (ref 37–54)
EGFRCR SERPLBLD CKD-EPI 2021: 90.6 ML/MIN/1.73
EOSINOPHIL # BLD AUTO: 0.15 10*3/MM3 (ref 0–0.4)
EOSINOPHIL NFR BLD AUTO: 2.8 % (ref 0.3–6.2)
ERYTHROCYTE [DISTWIDTH] IN BLOOD BY AUTOMATED COUNT: 11.9 % (ref 12.3–15.4)
ERYTHROCYTE [SEDIMENTATION RATE] IN BLOOD: <1 MM/HR (ref 0–20)
FOLATE SERPL-MCNC: 13.5 NG/ML (ref 4.78–24.2)
GLOBULIN UR ELPH-MCNC: 2.3 GM/DL
GLUCOSE SERPL-MCNC: 86 MG/DL (ref 65–99)
GLUCOSE UR STRIP-MCNC: NEGATIVE MG/DL
HCT VFR BLD AUTO: 43.5 % (ref 37.5–51)
HDLC SERPL-MCNC: 53 MG/DL (ref 40–60)
HGB BLD-MCNC: 15.6 G/DL (ref 13–17.7)
HGB UR QL STRIP.AUTO: NEGATIVE
HOLD SPECIMEN: NORMAL
IMM GRANULOCYTES # BLD AUTO: 0.02 10*3/MM3 (ref 0–0.05)
IMM GRANULOCYTES NFR BLD AUTO: 0.4 % (ref 0–0.5)
KETONES UR QL STRIP: NEGATIVE
LDLC SERPL CALC-MCNC: 146 MG/DL (ref 0–100)
LDLC/HDLC SERPL: 2.72 {RATIO}
LEUKOCYTE ESTERASE UR QL STRIP.AUTO: NEGATIVE
LYMPHOCYTES # BLD AUTO: 2.55 10*3/MM3 (ref 0.7–3.1)
LYMPHOCYTES NFR BLD AUTO: 47.8 % (ref 19.6–45.3)
MCH RBC QN AUTO: 32.4 PG (ref 26.6–33)
MCHC RBC AUTO-ENTMCNC: 35.9 G/DL (ref 31.5–35.7)
MCV RBC AUTO: 90.2 FL (ref 79–97)
MONOCYTES # BLD AUTO: 0.67 10*3/MM3 (ref 0.1–0.9)
MONOCYTES NFR BLD AUTO: 12.6 % (ref 5–12)
NEUTROPHILS NFR BLD AUTO: 1.89 10*3/MM3 (ref 1.7–7)
NEUTROPHILS NFR BLD AUTO: 35.5 % (ref 42.7–76)
NITRITE UR QL STRIP: NEGATIVE
NRBC BLD AUTO-RTO: 0 /100 WBC (ref 0–0.2)
PH UR STRIP.AUTO: 5.5 [PH] (ref 5–8)
PLATELET # BLD AUTO: 221 10*3/MM3 (ref 140–450)
PMV BLD AUTO: 11.4 FL (ref 6–12)
POTASSIUM SERPL-SCNC: 4.4 MMOL/L (ref 3.5–5.2)
PROT SERPL-MCNC: 6.8 G/DL (ref 6–8.5)
PROT UR QL STRIP: NEGATIVE
PSA SERPL-MCNC: 1.28 NG/ML (ref 0–4)
RBC # BLD AUTO: 4.82 10*6/MM3 (ref 4.14–5.8)
SODIUM SERPL-SCNC: 140 MMOL/L (ref 136–145)
SP GR UR STRIP: 1.02 (ref 1–1.03)
T4 FREE SERPL-MCNC: 1.21 NG/DL (ref 0.93–1.7)
TESTOST SERPL-MCNC: 707 NG/DL (ref 193–740)
TRIGL SERPL-MCNC: 64 MG/DL (ref 0–150)
TSH SERPL DL<=0.05 MIU/L-ACNC: 0.62 UIU/ML (ref 0.27–4.2)
URATE SERPL-MCNC: 7.4 MG/DL (ref 3.4–7)
UROBILINOGEN UR QL STRIP: NORMAL
VIT B12 BLD-MCNC: 291 PG/ML (ref 211–946)
VLDLC SERPL-MCNC: 11 MG/DL (ref 5–40)
WBC NRBC COR # BLD: 5.33 10*3/MM3 (ref 3.4–10.8)

## 2023-10-20 PROCEDURE — 85652 RBC SED RATE AUTOMATED: CPT

## 2023-10-20 PROCEDURE — G0103 PSA SCREENING: HCPCS

## 2023-10-20 PROCEDURE — 84439 ASSAY OF FREE THYROXINE: CPT

## 2023-10-20 PROCEDURE — 36415 COLL VENOUS BLD VENIPUNCTURE: CPT

## 2023-10-20 PROCEDURE — 82306 VITAMIN D 25 HYDROXY: CPT

## 2023-10-20 PROCEDURE — 81003 URINALYSIS AUTO W/O SCOPE: CPT

## 2023-10-20 PROCEDURE — 80050 GENERAL HEALTH PANEL: CPT

## 2023-10-20 PROCEDURE — 80061 LIPID PANEL: CPT

## 2023-10-20 PROCEDURE — 82746 ASSAY OF FOLIC ACID SERUM: CPT

## 2023-10-20 PROCEDURE — 82607 VITAMIN B-12: CPT

## 2023-10-20 PROCEDURE — 84403 ASSAY OF TOTAL TESTOSTERONE: CPT

## 2023-10-20 PROCEDURE — 86140 C-REACTIVE PROTEIN: CPT

## 2023-10-20 PROCEDURE — 84550 ASSAY OF BLOOD/URIC ACID: CPT

## 2023-10-25 ENCOUNTER — OFFICE VISIT (OUTPATIENT)
Dept: ORTHOPEDIC SURGERY | Facility: CLINIC | Age: 53
End: 2023-10-25
Payer: COMMERCIAL

## 2023-10-25 VITALS
HEIGHT: 68 IN | OXYGEN SATURATION: 96 % | SYSTOLIC BLOOD PRESSURE: 143 MMHG | HEART RATE: 70 BPM | WEIGHT: 188.4 LBS | DIASTOLIC BLOOD PRESSURE: 98 MMHG | BODY MASS INDEX: 28.55 KG/M2

## 2023-10-25 DIAGNOSIS — M65.331 TRIGGER FINGER, RIGHT MIDDLE FINGER: ICD-10-CM

## 2023-10-25 DIAGNOSIS — M65.352 TRIGGER LITTLE FINGER OF LEFT HAND: Primary | ICD-10-CM

## 2023-10-25 RX ORDER — TRIAMCINOLONE ACETONIDE 40 MG/ML
40 INJECTION, SUSPENSION INTRA-ARTICULAR; INTRAMUSCULAR
Status: COMPLETED | OUTPATIENT
Start: 2023-10-25 | End: 2023-10-25

## 2023-10-25 RX ORDER — LIDOCAINE HYDROCHLORIDE 10 MG/ML
1 INJECTION, SOLUTION INFILTRATION; PERINEURAL
Status: COMPLETED | OUTPATIENT
Start: 2023-10-25 | End: 2023-10-25

## 2023-10-25 RX ADMIN — TRIAMCINOLONE ACETONIDE 40 MG: 40 INJECTION, SUSPENSION INTRA-ARTICULAR; INTRAMUSCULAR at 09:15

## 2023-10-25 RX ADMIN — LIDOCAINE HYDROCHLORIDE 1 ML: 10 INJECTION, SOLUTION INFILTRATION; PERINEURAL at 09:15

## 2023-10-25 NOTE — PROGRESS NOTES
"Chief Complaint  Initial Evaluation of the Right Hand and Initial Evaluation of the Left Hand     Subjective      Marcelino Ram presents to [facility] for initial evaluation of the right hand and the left hand.  He has triggering of the left 5 th digit and the right middle finger.  He has tightness and difficulty with FMC and  tasks.        No Known Allergies     Social History     Socioeconomic History    Marital status:    Tobacco Use    Smoking status: Never    Smokeless tobacco: Never   Vaping Use    Vaping Use: Never used   Substance and Sexual Activity    Alcohol use: Not Currently     Comment: occasional    Drug use: Never    Sexual activity: Yes     Partners: Female        I reviewed the patient's chief complaint, history of present illness, review of systems, past medical history, surgical history, family history, social history, medications, and allergy list.     Review of Systems     Constitutional: Denies fevers, chills, weight loss  Cardiovascular: Denies chest pain, shortness of breath  Skin: Denies rashes, acute skin changes  Neurologic: Denies headache, loss of consciousness        Vital Signs:   /98 (BP Location: Left arm, Patient Position: Sitting, Cuff Size: Adult)   Pulse 70   Ht 172.7 cm (67.99\")   Wt 85.5 kg (188 lb 6.4 oz)   SpO2 96%   BMI 28.65 kg/m²          Physical Exam  General: Alert. No acute distress    Ortho Exam        BILATERAL HANDS Negative Compression testing/ Negative Tinels. NegativeFinkelsteins. Negative Nguyen's testing. Negative CMC grind testing. Negative Phalens. Full ROM of the hand, fingers, elbow and wrist. Positive Triggering of the digit. Sensation grossly intact to light touch, median, radial and ulnar nerve. Positive AIN, PIN and ulnar nerve motor function intact. Axillary nerve intact. Positive pulses.        Right hand trigger finger  Consent given by: patient  Site marked: site marked  Timeout: Immediately prior " to procedure a time out was called to verify the correct patient, procedure, equipment, support staff and site/side marked as required   Supporting Documentation  Indications: pain   Procedure Details  Location: -   Location: right hand trigger finger.Preparation: Patient was prepped and draped in the usual sterile fashion  Needle size: 25 G  Medications administered: 1 mL lidocaine 1 %; 40 mg triamcinolone acetonide 40 MG/ML  Patient tolerance: patient tolerated the procedure well with no immediate complications      Left hand trigger finger  Consent given by: patient  Site marked: site marked  Timeout: Immediately prior to procedure a time out was called to verify the correct patient, procedure, equipment, support staff and site/side marked as required   Supporting Documentation  Indications: pain   Procedure Details  Location: -   Location: left hand trigger finger.Preparation: Patient was prepped and draped in the usual sterile fashion  Needle size: 25 G  Medications administered: 1 mL lidocaine 1 %; 40 mg triamcinolone acetonide 40 MG/ML  Patient tolerance: patient tolerated the procedure well with no immediate complications          Imaging Results (Most Recent)       None             Result Review :           Assessment and Plan     Diagnoses and all orders for this visit:    1. Trigger little finger of left hand (Primary)    2. Trigger finger, right middle finger        Discussed the treatment plan with the patient.     Discussed the risks and benefits of conservative measures. The patient expressed understanding and wished to proceed with a left trigger finger injection on the 5 th digit and right middle finger injection. He tolerated the injection well.       Call or return if worsening symptoms.    Follow Up     PRN      Patient was given instructions and counseling regarding his condition or for health maintenance advice. Please see specific information pulled into the AVS if appropriate.     Scribed for  Steve Elliott MD by Sangeeta Mckeon MA.  10/25/23   09:33 EDT      I have personally performed the services described in this document as scribed by the above individual and it is both accurate and complete. Steve Elliott MD 10/25/23

## 2023-10-31 DIAGNOSIS — E78.2 MIXED HYPERLIPIDEMIA: ICD-10-CM

## 2023-10-31 DIAGNOSIS — Z00.00 WELL ADULT HEALTH CHECK: ICD-10-CM

## 2023-10-31 DIAGNOSIS — E79.0 HYPERURICEMIA: Primary | ICD-10-CM

## 2023-10-31 DIAGNOSIS — E55.9 VITAMIN D DEFICIENCY: ICD-10-CM

## 2023-10-31 RX ORDER — ALLOPURINOL 100 MG/1
100 TABLET ORAL DAILY
Qty: 90 TABLET | Refills: 1 | Status: SHIPPED | OUTPATIENT
Start: 2023-10-31

## 2024-04-17 ENCOUNTER — OFFICE VISIT (OUTPATIENT)
Dept: INTERNAL MEDICINE | Age: 54
End: 2024-04-17
Payer: COMMERCIAL

## 2024-04-17 VITALS
HEIGHT: 68 IN | HEART RATE: 74 BPM | TEMPERATURE: 98.9 F | SYSTOLIC BLOOD PRESSURE: 114 MMHG | WEIGHT: 184.4 LBS | OXYGEN SATURATION: 97 % | DIASTOLIC BLOOD PRESSURE: 88 MMHG | BODY MASS INDEX: 27.95 KG/M2

## 2024-04-17 DIAGNOSIS — Z00.00 WELL ADULT EXAM: ICD-10-CM

## 2024-04-17 DIAGNOSIS — K22.719 BARRETT'S ESOPHAGUS WITH DYSPLASIA: ICD-10-CM

## 2024-04-17 DIAGNOSIS — I10 ESSENTIAL HYPERTENSION: Primary | ICD-10-CM

## 2024-04-17 DIAGNOSIS — E55.9 VITAMIN D DEFICIENCY: ICD-10-CM

## 2024-04-17 DIAGNOSIS — E79.0 ELEVATED URIC ACID IN BLOOD: ICD-10-CM

## 2024-04-17 DIAGNOSIS — Z12.5 PROSTATE CANCER SCREENING: ICD-10-CM

## 2024-04-17 DIAGNOSIS — E78.2 MIXED HYPERLIPIDEMIA: ICD-10-CM

## 2024-04-17 RX ORDER — OMEPRAZOLE 20 MG/1
20 CAPSULE, DELAYED RELEASE ORAL DAILY
COMMUNITY

## 2024-04-17 NOTE — PROGRESS NOTES
Chief Complaint  Follow-up (Pt states that this is routine, he didn't have labs  back in November for uric acid and then he didn't have April labs yet. /He would like to know if he can come off of his Beta Blocker. )    Subjective          Marcelino Ram presents to [facility]     History of Present Illness   Patient is a 53-year-old male, former patient of Edwina AndersEltechss, who I saw for the first time 8/21.  He has underlying hyperlipidemia, hypertension, as well as a history of Hicks's esophagus.  He is coming in now 4/24 for routine 6-month f/u.  I will review any labs that may have been performed for this visit and address any new concerns.    1.  Let the patient know that his gout level is elevated, so I sent a low-dose pill over to his pharmacy.  Perhaps this is something to do with some of his pains.    2.  I put orders in to check a gout level in about 3 months, patient to call for the results.    3.  I also put orders in for fasting labs to be done 6 months, just a few days before his appointment.    4.  Lastly, his triglycerides are fine without the fenofibrate, but his cholesterol is up for unrelated reasons, he needs to watch some of the fats in his diet.    Review of Systems   Constitutional:  Negative for appetite change, fatigue and fever.   HENT:  Negative for congestion and ear pain.    Eyes:  Negative for blurred vision.   Respiratory:  Negative for cough, chest tightness, shortness of breath and wheezing.    Cardiovascular:  Negative for chest pain, palpitations and leg swelling.   Gastrointestinal:  Negative for abdominal pain.   Genitourinary:  Negative for difficulty urinating, dysuria and hematuria.   Musculoskeletal:  Negative for arthralgias and gait problem.   Skin:  Negative for skin lesions.   Neurological:  Negative for syncope, memory problem and confusion.   Psychiatric/Behavioral:  Negative for self-injury and depressed mood.        Objective  "  Vital Signs:   /88   Pulse 74   Temp 98.9 °F (37.2 °C) (Skin)   Ht 172.7 cm (67.99\")   Wt 83.6 kg (184 lb 6.4 oz)   SpO2 97%   BMI 28.04 kg/m²           Physical Exam  Vitals and nursing note reviewed.   Constitutional:       General: He is not in acute distress.     Appearance: Normal appearance. He is not toxic-appearing.   HENT:      Head: Atraumatic.      Right Ear: External ear normal.      Left Ear: External ear normal.      Nose: Nose normal.      Mouth/Throat:      Mouth: Mucous membranes are moist.   Eyes:      General:         Right eye: No discharge.         Left eye: No discharge.      Extraocular Movements: Extraocular movements intact.      Pupils: Pupils are equal, round, and reactive to light.   Cardiovascular:      Rate and Rhythm: Normal rate and regular rhythm.      Pulses: Normal pulses.      Heart sounds: Normal heart sounds. No murmur heard.     No gallop.   Pulmonary:      Effort: Pulmonary effort is normal. No respiratory distress.      Breath sounds: No wheezing, rhonchi or rales.   Abdominal:      General: There is no distension.      Palpations: Abdomen is soft. There is no mass.      Tenderness: There is no abdominal tenderness. There is no guarding.   Musculoskeletal:         General: No swelling or tenderness.      Cervical back: No tenderness.      Right lower leg: No edema.      Left lower leg: No edema.   Skin:     General: Skin is warm and dry.      Findings: No rash.   Neurological:      General: No focal deficit present.      Mental Status: He is alert and oriented to person, place, and time. Mental status is at baseline.      Motor: No weakness.      Gait: Gait normal.   Psychiatric:         Mood and Affect: Mood normal.         Thought Content: Thought content normal.          Result Review :   The following data was reviewed by: Chris Jean MD on 08/18/2021:                  Assessment and Plan    Diagnoses and all orders for this visit:    1. Essential " hypertension (Primary)  Assessment & Plan:  Patient's blood pressures range as of his 4/24 OV.  He is on just very low-dose metoprolol 12.5 daily, would like to try to come off of this.  He has about 5 pills left, will wean this off every other day.    If blood pressure trends up significantly off of this, patient will give us a call, likewise if he happens to have significant palpitations without it, he will let us know.    If no palpitations but blood pressure trends up, obviously will use low-dose ARB/etc.      Orders:  -     Comprehensive Metabolic Panel; Future    2. Mixed hyperlipidemia  Assessment & Plan:  Patient's LDL is up from about 90 to 140 per his labs in 10/23.  Does not look like there is been much over the change in his weight, not sure if that was a fluke or what, but we can defer labs as of 4/24.  He was on fenofibrate previously, it certainly not indicated, his triglycerides were well normal.  Recommend about a 10 pound or so weight loss, reevaluate labs in the fall.    Orders:  -     Lipid Panel; Future    3. Elevated uric acid in blood  Assessment & Plan:  Patient was having some issues with hand pain, we checked some inflammatory markers as well as a uric acid level.  Will inflammatory markers were negative, the uric acid came back mildly elevated at 7.4.  We gave him a trial of allopurinol, his symptoms are stable, but he was also seen by Dr. Elliott in the meantime and had injections for trigger finger.  I asked him to put the allopurinol to the side for now, he will call if symptoms return.    Orders:  -     Uric Acid; Future    4. Hicks's esophagus with dysplasia  Overview:  EGD 2/22:  Esophageal mucosal changes consistent with long-segment Hicks's esophagus.  ---> path with no dysplasia.    Assessment & Plan:  Patient stable at this regards as of 4/24 with over-the-counter low-dose omeprazole.  Will send a reminder over to Dr. Schmitt's office, it has been right at 2 years  now.    Orders:  -     Ambulatory Referral to Gastroenterology    5. Well adult exam  -     CBC & Differential; Future  -     TSH+Free T4; Future  -     Urinalysis With Culture If Indicated -; Future  -     Vitamin B12 anemia; Future  -     Folate anemia; Future    6. Prostate cancer screening  -     PSA Screen; Future    7. Vitamin D deficiency  -     Vitamin D,25-Hydroxy; Future         BMI is >= 25 and <30. (Overweight) The following options were offered after discussion;: exercise counseling/recommendations and nutrition counseling/recommendations     --  --  Older notes:  VISIT 2/2/21 per Edwina:  Left shoulder pain  Abnl lesion on tongue - will show dentist again  Left knee pain  HTN   Diverticulosis with hx of diverticulitis  Vitamin D Deficiency  Hypertriglyceridemia - hasn't taken Fenofibrate x 1 month - has forgotten  Hicks's esophagus - on Protonix now, will repeat EGD in 1 year - due 7/2020  Scrotal Hydrocele/cysts - saw Dr. Berry, no further work up  Hiatal Hernia  --  --  Also sees: no one  --  --  Stress test: 7/2015 per Dr. Mejía - sending for another--->had this in 3/4/21  IMPRESSION:    1.  Normal myocardial SPECT perfusion study with exercise.    2.  No evidence of myocardial infarct or reversible ischemia.    3.  Normal left ventricular systolic function with ejection fraction of 65%      Colonoscopy: 7/2019 per Dr. 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  Patient was given instructions and counseling regarding his condition or for health maintenance advice. Please see specific information pulled into the AVS if  appropriate.       Answers submitted by the patient for this visit:  Primary Reason for Visit (Submitted on 10/16/2023)  What is the primary reason for your visit?: Other  Other (Submitted on 10/16/2023)  Please describe your symptoms.: Feet are sore and fingers are stiff in the mornings, two of them lock up.  Also, left knee has given out on me a few times.  Have you had these symptoms before?: No  How long have you been having these symptoms?: Greater than 2 weeks  Please describe any probable cause for these symptoms. : Arthritis?

## 2024-04-17 NOTE — ASSESSMENT & PLAN NOTE
Patient's blood pressures range as of his 4/24 OV.  He is on just very low-dose metoprolol 12.5 daily, would like to try to come off of this.  He has about 5 pills left, will wean this off every other day.    If blood pressure trends up significantly off of this, patient will give us a call, likewise if he happens to have significant palpitations without it, he will let us know.    If no palpitations but blood pressure trends up, obviously will use low-dose ARB/etc.     [Initial Visit ___] : [unfilled] is here today for an initial visit  for [unfilled]

## 2024-04-17 NOTE — ASSESSMENT & PLAN NOTE
Patient's LDL is up from about 90 to 140 per his labs in 10/23.  Does not look like there is been much over the change in his weight, not sure if that was a fluke or what, but we can defer labs as of 4/24.  He was on fenofibrate previously, it certainly not indicated, his triglycerides were well normal.  Recommend about a 10 pound or so weight loss, reevaluate labs in the fall.

## 2024-04-17 NOTE — ASSESSMENT & PLAN NOTE
Patient stable at this regards as of 4/24 with over-the-counter low-dose omeprazole.  Will send a reminder over to Dr. Schmitt's office, it has been right at 2 years now.

## 2024-04-17 NOTE — ASSESSMENT & PLAN NOTE
Patient was having some issues with hand pain, we checked some inflammatory markers as well as a uric acid level.  Will inflammatory markers were negative, the uric acid came back mildly elevated at 7.4.  We gave him a trial of allopurinol, his symptoms are stable, but he was also seen by Dr. Elliott in the meantime and had injections for trigger finger.  I asked him to put the allopurinol to the side for now, he will call if symptoms return.

## 2024-08-13 ENCOUNTER — OFFICE VISIT (OUTPATIENT)
Dept: GASTROENTEROLOGY | Facility: CLINIC | Age: 54
End: 2024-08-13
Payer: COMMERCIAL

## 2024-08-13 ENCOUNTER — PREP FOR SURGERY (OUTPATIENT)
Dept: OTHER | Facility: HOSPITAL | Age: 54
End: 2024-08-13
Payer: COMMERCIAL

## 2024-08-13 VITALS
HEIGHT: 68 IN | OXYGEN SATURATION: 99 % | HEART RATE: 86 BPM | SYSTOLIC BLOOD PRESSURE: 171 MMHG | BODY MASS INDEX: 27.74 KG/M2 | DIASTOLIC BLOOD PRESSURE: 110 MMHG | WEIGHT: 183 LBS

## 2024-08-13 DIAGNOSIS — K21.9 GASTROESOPHAGEAL REFLUX DISEASE WITHOUT ESOPHAGITIS: ICD-10-CM

## 2024-08-13 DIAGNOSIS — K22.719 BARRETT'S ESOPHAGUS WITH DYSPLASIA: Primary | ICD-10-CM

## 2024-08-13 NOTE — H&P (VIEW-ONLY)
Chief Complaint    Marcelino Ram is a 54 y.o. male who presents to [facility]STROENTEROLOGY for follow-up of esophageal reflux and Hicks's esophagus.  His last upper endoscopy was February 2022 showing 4 cm of Hicks's esophagus without dysplasia.  His last colonoscopy was with Dr. Sousa in July 2019 showing diverticulosis but no polyps.  Repeat advised for 10 years.  Patient returns now overall doing quite well as long as he takes his Prilosec OTC daily.  If he misses a dose he has quick return of his symptoms.  He denies any dysphagia weight loss or other GI complaints.    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    Result Review :     The following data was reviewed by: Marcelino Gray MD on 08/13/2024:             Past Medical History:   Diagnosis Date    Angioneurotic edema, initial encounter     Anxiety disorder, unspecified     APC (adenomatous polyposis coli)     Hicks esophagus     Diverticulitis of colon     Diverticulosis     Esophageal reflux     Essential (primary) hypertension     Fracture of nasal bones 1986    Fracture of unsp metatarsal bone(s), left foot, init     GERD (gastroesophageal reflux disease)     HL (hearing loss)     Hyperlipidemia, unspecified     Hypertension     Neural foraminal stenosis of cervical spine     Other somatoform disorders     PAC (premature atrial contraction)     dr. gutierrez following    Palpitations     Tinnitus 2020    post covid    Vitamin D deficiency, unspecified        Past Surgical History:   Procedure Laterality Date    COLONOSCOPY  07/2019    ENDOSCOPIC FUNCTIONAL SINUS SURGERY (FESS) N/A 04/25/2022    Procedure: ENDOSCOPIC FUNCTIONAL SINUS SURGERY;  Surgeon: Segundo Ott MD;  [place] [facility] [place] [facility] [place]  Service: ENT;  Laterality: N/A;    ENDOSCOPIC FUNCTIONAL SINUS SURGERY (FESS)  04/25/2022    Procedure: ;  Surgeon: Segundo Ott MD;  [place] [facility] [place] [facility] [place]  Service: ENT;;    ENDOSCOPY N/A 02/02/2022    Procedure:  "ESOPHAGOGASTRODUODENOSCOPY WITH BX;  Surgeon: Marcelino Gray MD;  Location: [facility] ENDOSCOPY;  Service: Gastroenterology;  Laterality: N/A;  HIATAL HERNIA, FUNDIC GLAND POLYPS, MORALES'S ESOPHAGUS    KNEE ARTHROSCOPY Right     NOSE SURGERY      NOSE FRACTURE    TONSILLECTOMY      TONSILLECTOMY AND ADENOIDECTOMY N/A 04/25/2022    Procedure: TONSILLECTOMY AND ADENOIDECTOMY, endoscopic sinus surgery with polypectomy, excision of tongue lesion, direct laryngoscopy, esophagoscopy, bronchoscopy;  Surgeon: Segundo Ott MD;  Location: [facility];  Service: ENT;  Laterality: N/A;    UPPER GASTROINTESTINAL ENDOSCOPY         Social History     Social History Narrative    Not on file       Objective     Vital Signs:   BP (!) 171/110 (BP Location: Right arm, Patient Position: Sitting, Cuff Size: Adult)   Pulse 86   Ht 172.7 cm (68\")   Wt 83 kg (183 lb)   SpO2 99%   BMI 27.83 kg/m²     Body mass index is 27.83 kg/m².    Physical Exam            Assessment and Plan    Diagnoses and all orders for this visit:    1. Morales's esophagus with dysplasia (Primary)    I will schedule the patient for an upper endoscopy given his history of esophageal reflux and 4 cm of Morales's esophagus.  Previously appeared that the patient had EOE although his biopsies were negative for eosinophils.  He denies any dysphagia currently.  His last colonoscopy showed diverticulosis in July 2019 and 10-year repeat was recommended.  He has no lower GI complaints.  I discussed risk and benefits of EGD with the patient and he is willing to proceed.              Follow Up   No follow-ups on file.  Patient was given instructions and counseling regarding his condition or for health maintenance advice. Please see specific information pulled into the AVS if appropriate.     "

## 2024-08-13 NOTE — PROGRESS NOTES
Chief Complaint    Marcelino Ram is a 54 y.o. male who presents to [facility]STROENTEROLOGY for follow-up of esophageal reflux and Hicks's esophagus.  His last upper endoscopy was February 2022 showing 4 cm of Hicks's esophagus without dysplasia.  His last colonoscopy was with Dr. Sousa in July 2019 showing diverticulosis but no polyps.  Repeat advised for 10 years.  Patient returns now overall doing quite well as long as he takes his Prilosec OTC daily.  If he misses a dose he has quick return of his symptoms.  He denies any dysphagia weight loss or other GI complaints.    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    Result Review :     The following data was reviewed by: Marcelino Gray MD on 08/13/2024:             Past Medical History:   Diagnosis Date    Angioneurotic edema, initial encounter     Anxiety disorder, unspecified     APC (adenomatous polyposis coli)     Hicks esophagus     Diverticulitis of colon     Diverticulosis     Esophageal reflux     Essential (primary) hypertension     Fracture of nasal bones 1986    Fracture of unsp metatarsal bone(s), left foot, init     GERD (gastroesophageal reflux disease)     HL (hearing loss)     Hyperlipidemia, unspecified     Hypertension     Neural foraminal stenosis of cervical spine     Other somatoform disorders     PAC (premature atrial contraction)     dr. gutierrez following    Palpitations     Tinnitus 2020    post covid    Vitamin D deficiency, unspecified        Past Surgical History:   Procedure Laterality Date    COLONOSCOPY  07/2019    ENDOSCOPIC FUNCTIONAL SINUS SURGERY (FESS) N/A 04/25/2022    Procedure: ENDOSCOPIC FUNCTIONAL SINUS SURGERY;  Surgeon: Segundo Ott MD;  [place]: [facility] [place] [facility] [place];  Service: ENT;  Laterality: N/A;    ENDOSCOPIC FUNCTIONAL SINUS SURGERY (FESS)  04/25/2022    Procedure: ;  Surgeon: Segundo Ott MD;  Location: [facility] [place];  Service: ENT;;    ENDOSCOPY N/A 02/02/2022    Procedure:  "ESOPHAGOGASTRODUODENOSCOPY WITH BX;  Surgeon: Marcelino Gray MD;  Location: [facility] ENDOSCOPY;  Service: Gastroenterology;  Laterality: N/A;  HIATAL HERNIA, FUNDIC GLAND POLYPS, MORALES'S ESOPHAGUS    KNEE ARTHROSCOPY Right     NOSE SURGERY      NOSE FRACTURE    TONSILLECTOMY      TONSILLECTOMY AND ADENOIDECTOMY N/A 04/25/2022    Procedure: TONSILLECTOMY AND ADENOIDECTOMY, endoscopic sinus surgery with polypectomy, excision of tongue lesion, direct laryngoscopy, esophagoscopy, bronchoscopy;  Surgeon: Segundo Ott MD;  Location: [facility];  Service: ENT;  Laterality: N/A;    UPPER GASTROINTESTINAL ENDOSCOPY         Social History     Social History Narrative    Not on file       Objective     Vital Signs:   BP (!) 171/110 (BP Location: Right arm, Patient Position: Sitting, Cuff Size: Adult)   Pulse 86   Ht 172.7 cm (68\")   Wt 83 kg (183 lb)   SpO2 99%   BMI 27.83 kg/m²     Body mass index is 27.83 kg/m².    Physical Exam            Assessment and Plan    Diagnoses and all orders for this visit:    1. Morales's esophagus with dysplasia (Primary)    I will schedule the patient for an upper endoscopy given his history of esophageal reflux and 4 cm of Morales's esophagus.  Previously appeared that the patient had EOE although his biopsies were negative for eosinophils.  He denies any dysphagia currently.  His last colonoscopy showed diverticulosis in July 2019 and 10-year repeat was recommended.  He has no lower GI complaints.  I discussed risk and benefits of EGD with the patient and he is willing to proceed.              Follow Up   No follow-ups on file.  Patient was given instructions and counseling regarding his condition or for health maintenance advice. Please see specific information pulled into the AVS if appropriate.     "

## 2024-08-27 NOTE — PRE-PROCEDURE INSTRUCTIONS
"Instructed on date and arrival time of 1230. Instructed that arrival time is not procedure time but allows time to prepare for procedure. Come to entrance \"C\".  Must have  over age 18 to drive home.  May have two visitors; however, children under 12 must stay in waiting room.  Discussed diet/NPO.  May take medications as usual except for blood thinners, diabetic medications, or weight loss medications.  Verbalized understanding of instructions given.  Instructed to call for questions or concerns.  "

## 2024-09-03 ENCOUNTER — ANESTHESIA EVENT (OUTPATIENT)
Dept: GASTROENTEROLOGY | Facility: HOSPITAL | Age: 54
End: 2024-09-03
Payer: COMMERCIAL

## 2024-09-03 NOTE — ANESTHESIA PREPROCEDURE EVALUATION
Anesthesia Evaluation     Patient summary reviewed and Nursing notes reviewed   NPO Solid Status: > 8 hours  NPO Liquid Status: > 2 hours           Airway   Mallampati: II  TM distance: >3 FB  Neck ROM: full  No difficulty expected  Dental - normal exam     Pulmonary - normal exam    breath sounds clear to auscultation  Cardiovascular - normal exam    ECG reviewed  Rhythm: regular  Rate: normal    (+) hypertension well controlled, hyperlipidemia    ROS comment: Hx of palpitations, PACs    Neuro/Psych  (+) psychiatric history Anxiety  GI/Hepatic/Renal/Endo    (+) GERD ([place]) well controlled    Musculoskeletal     Abdominal  - normal exam   Substance History      OB/GYN          Other        ROS/Med Hx Other: EKG, 4/25/2022:  HEART RATE= 73  bpm  RR Interval= 820  ms  CT Interval= 167  ms  P Horizontal Axis= 5  deg  P Front Axis= 54  deg  QRSD Interval= 89  ms  QT Interval= 377  ms  QRS Axis= 53  deg  T Wave Axis= 12  deg  - NORMAL ECG -  Sinus rhythm  No previous ECG available for comparison                      Anesthesia Plan    ASA 2     general   total IV anesthesia  (Total IV Anesthesia    Patient understands anesthesia not responsible for dental damage.  )  intravenous induction     Anesthetic plan, risks, benefits, and alternatives have been provided, discussed and informed consent has been obtained with: patient.  Pre-procedure education provided  Plan discussed with CRNA.        CODE STATUS:

## 2024-09-04 ENCOUNTER — HOSPITAL ENCOUNTER (OUTPATIENT)
Facility: HOSPITAL | Age: 54
Setting detail: HOSPITAL OUTPATIENT SURGERY
Discharge: HOME | End: 2024-09-04
Attending: INTERNAL MEDICINE | Admitting: INTERNAL MEDICINE
Payer: COMMERCIAL

## 2024-09-04 ENCOUNTER — ANESTHESIA (OUTPATIENT)
Dept: GASTROENTEROLOGY | Facility: HOSPITAL | Age: 54
End: 2024-09-04
Payer: COMMERCIAL

## 2024-09-04 VITALS
BODY MASS INDEX: 27.89 KG/M2 | HEART RATE: 75 BPM | OXYGEN SATURATION: 97 % | DIASTOLIC BLOOD PRESSURE: 98 MMHG | RESPIRATION RATE: 15 BRPM | SYSTOLIC BLOOD PRESSURE: 127 MMHG | WEIGHT: 183.42 LBS | TEMPERATURE: 97 F

## 2024-09-04 DIAGNOSIS — K21.9 GASTROESOPHAGEAL REFLUX DISEASE WITHOUT ESOPHAGITIS: ICD-10-CM

## 2024-09-04 PROCEDURE — 25810000003 LACTATED RINGERS PER 1000 ML: Performed by: NURSE ANESTHETIST, CERTIFIED REGISTERED

## 2024-09-04 PROCEDURE — 88305 TISSUE EXAM BY PATHOLOGIST: CPT | Performed by: INTERNAL MEDICINE

## 2024-09-04 PROCEDURE — 25010000002 PROPOFOL 10 MG/ML EMULSION

## 2024-09-04 PROCEDURE — 43239 EGD BIOPSY SINGLE/MULTIPLE: CPT | Performed by: INTERNAL MEDICINE

## 2024-09-04 RX ORDER — PROPOFOL 10 MG/ML
VIAL (ML) INTRAVENOUS AS NEEDED
Status: DISCONTINUED | OUTPATIENT
Start: 2024-09-04 | End: 2024-09-04 | Stop reason: SURG

## 2024-09-04 RX ORDER — MULTIVIT WITH MINERALS/LUTEIN
250 TABLET ORAL DAILY
COMMUNITY

## 2024-09-04 RX ORDER — SODIUM CHLORIDE, SODIUM LACTATE, POTASSIUM CHLORIDE, CALCIUM CHLORIDE 600; 310; 30; 20 MG/100ML; MG/100ML; MG/100ML; MG/100ML
30 INJECTION, SOLUTION INTRAVENOUS CONTINUOUS
Status: DISCONTINUED | OUTPATIENT
Start: 2024-09-04 | End: 2024-09-04 | Stop reason: HOSPADM

## 2024-09-04 RX ORDER — LIDOCAINE HYDROCHLORIDE 20 MG/ML
INJECTION, SOLUTION EPIDURAL; INFILTRATION; INTRACAUDAL; PERINEURAL AS NEEDED
Status: DISCONTINUED | OUTPATIENT
Start: 2024-09-04 | End: 2024-09-04 | Stop reason: SURG

## 2024-09-04 RX ORDER — PSYLLIUM SEED (WITH DEXTROSE)
POWDER (GRAM) ORAL DAILY
COMMUNITY

## 2024-09-04 RX ORDER — ZINC SULFATE 50(220)MG
220 CAPSULE ORAL DAILY
COMMUNITY

## 2024-09-04 RX ORDER — DEXMEDETOMIDINE HYDROCHLORIDE 4 UG/ML
INJECTION, SOLUTION INTRAVENOUS AS NEEDED
Status: DISCONTINUED | OUTPATIENT
Start: 2024-09-04 | End: 2024-09-04 | Stop reason: SURG

## 2024-09-04 RX ADMIN — DEXMEDETOMIDINE HYDROCHLORIDE IN 0.9% SODIUM CHLORIDE 12 MCG: 4 INJECTION INTRAVENOUS at 14:01

## 2024-09-04 RX ADMIN — LIDOCAINE HYDROCHLORIDE 100 MG: 20 INJECTION, SOLUTION EPIDURAL; INFILTRATION; INTRACAUDAL; PERINEURAL at 14:01

## 2024-09-04 RX ADMIN — PROPOFOL 175 MCG/KG/MIN: 10 INJECTION, EMULSION INTRAVENOUS at 14:02

## 2024-09-04 RX ADMIN — SODIUM CHLORIDE, POTASSIUM CHLORIDE, SODIUM LACTATE AND CALCIUM CHLORIDE: 600; 310; 30; 20 INJECTION, SOLUTION INTRAVENOUS at 13:59

## 2024-09-04 RX ADMIN — PROPOFOL 20 MG: 10 INJECTION, EMULSION INTRAVENOUS at 14:03

## 2024-09-04 RX ADMIN — PROPOFOL 100 MG: 10 INJECTION, EMULSION INTRAVENOUS at 14:01

## 2024-09-04 NOTE — ANESTHESIA POSTPROCEDURE EVALUATION
Patient: Marcelino Ram    Procedure Summary       Date: 09/04/24 Room / Location: [facility] ENDOSCOPY 2 / [facility] ENDOSCOPY    Anesthesia Start: 1359 Anesthesia Stop: 1420    Procedure: ESOPHAGOGASTRODUODENOSCOPY WITH BIOPSIES Diagnosis:       Gastroesophageal reflux disease without esophagitis      (Gastroesophageal reflux disease without esophagitis [K21.9])    Surgeons: Marcelino Gray MD Provider: Ashley Lewis CRNA    Anesthesia Type: general ASA Status: 2            Anesthesia Type: general    Vitals  Vitals Value Taken Time   BP     Temp     Pulse 81 09/04/24 1420   Resp     SpO2 95 % 09/04/24 1420   Vitals shown include unfiled device data.        Post Anesthesia Care and Evaluation    Patient location during evaluation: bedside  Patient participation: complete - patient participated  Level of consciousness: awake  Pain management: adequate    Airway patency: patent  Anesthetic complications: No anesthetic complications  PONV Status: controlled  Cardiovascular status: acceptable and stable  Respiratory status: acceptable, spontaneous ventilation and room air  Hydration status: acceptable

## 2024-09-09 ENCOUNTER — TELEPHONE (OUTPATIENT)
Dept: GASTROENTEROLOGY | Facility: CLINIC | Age: 54
End: 2024-09-09
Payer: COMMERCIAL

## 2024-09-09 NOTE — TELEPHONE ENCOUNTER
----- Message from Julia Keithnicol sent at 9/6/2024  3:29 PM EDT -----  I have reviewed the patients upper endoscopy and pathology. Report shows long segmented Hicks's and biopsies show intestinal metaplasia. Stomach biopsies benign. Biopsies are negative for H. Pylori, dysplasia, metaplasia, and malignancy. Continue present medication. Patient will need repeat EGD in 2 years for surveillance of Hicks's. Please place in recall. Send letter.

## 2024-09-10 NOTE — TELEPHONE ENCOUNTER
2yr EGD recall and care gap placed.  Attempted to call pt, vm box full.  esa     Spoke with mom who states that pt has the same barky cough that he gets this time every year. It started 3 days ago. Mom denies any other symptoms at this time. Pt had Pulmonology appt scheduled for 1000, but was told he had to re-schedule because of his cough.    Same day appt scheduled for 1530 with Sarah Beth Grider PA-C as requested by Mom.

## 2024-10-14 ENCOUNTER — LAB (OUTPATIENT)
Dept: LAB | Facility: HOSPITAL | Age: 54
End: 2024-10-14
Payer: COMMERCIAL

## 2024-10-14 DIAGNOSIS — E78.2 MIXED HYPERLIPIDEMIA: ICD-10-CM

## 2024-10-14 DIAGNOSIS — E79.0 HYPERURICEMIA: ICD-10-CM

## 2024-10-14 DIAGNOSIS — E55.9 VITAMIN D DEFICIENCY: ICD-10-CM

## 2024-10-14 DIAGNOSIS — Z12.5 PROSTATE CANCER SCREENING: ICD-10-CM

## 2024-10-14 DIAGNOSIS — G89.29 CHRONIC PAIN OF LEFT HAND: ICD-10-CM

## 2024-10-14 DIAGNOSIS — E79.0 ELEVATED URIC ACID IN BLOOD: ICD-10-CM

## 2024-10-14 DIAGNOSIS — Z00.00 WELL ADULT HEALTH CHECK: ICD-10-CM

## 2024-10-14 DIAGNOSIS — I10 ESSENTIAL HYPERTENSION: ICD-10-CM

## 2024-10-14 DIAGNOSIS — Z00.00 WELL ADULT EXAM: ICD-10-CM

## 2024-10-14 DIAGNOSIS — M79.642 CHRONIC PAIN OF LEFT HAND: ICD-10-CM

## 2024-10-14 LAB
25(OH)D3 SERPL-MCNC: 54 NG/ML (ref 30–100)
ALBUMIN SERPL-MCNC: 4.6 G/DL (ref 3.5–5.2)
ALBUMIN/GLOB SERPL: 1.9 G/DL
ALP SERPL-CCNC: 73 U/L (ref 39–117)
ALT SERPL W P-5'-P-CCNC: 19 U/L (ref 1–41)
ANION GAP SERPL CALCULATED.3IONS-SCNC: 9.5 MMOL/L (ref 5–15)
AST SERPL-CCNC: 18 U/L (ref 1–40)
BASOPHILS # BLD AUTO: 0.06 10*3/MM3 (ref 0–0.2)
BASOPHILS NFR BLD AUTO: 1 % (ref 0–1.5)
BILIRUB SERPL-MCNC: 1.2 MG/DL (ref 0–1.2)
BILIRUB UR QL STRIP: NEGATIVE
BUN SERPL-MCNC: 12 MG/DL (ref 6–20)
BUN/CREAT SERPL: 11.7 (ref 7–25)
CALCIUM SPEC-SCNC: 9.7 MG/DL (ref 8.6–10.5)
CHLORIDE SERPL-SCNC: 104 MMOL/L (ref 98–107)
CHOLEST SERPL-MCNC: 233 MG/DL (ref 0–200)
CLARITY UR: CLEAR
CO2 SERPL-SCNC: 28.5 MMOL/L (ref 22–29)
COLOR UR: YELLOW
CREAT SERPL-MCNC: 1.03 MG/DL (ref 0.76–1.27)
DEPRECATED RDW RBC AUTO: 40.1 FL (ref 37–54)
EGFRCR SERPLBLD CKD-EPI 2021: 86.9 ML/MIN/1.73
EOSINOPHIL # BLD AUTO: 0.24 10*3/MM3 (ref 0–0.4)
EOSINOPHIL NFR BLD AUTO: 4 % (ref 0.3–6.2)
ERYTHROCYTE [DISTWIDTH] IN BLOOD BY AUTOMATED COUNT: 11.8 % (ref 12.3–15.4)
FOLATE SERPL-MCNC: 8.28 NG/ML (ref 4.78–24.2)
GLOBULIN UR ELPH-MCNC: 2.4 GM/DL
GLUCOSE SERPL-MCNC: 91 MG/DL (ref 65–99)
GLUCOSE UR STRIP-MCNC: NEGATIVE MG/DL
HCT VFR BLD AUTO: 50.6 % (ref 37.5–51)
HDLC SERPL-MCNC: 43 MG/DL (ref 40–60)
HGB BLD-MCNC: 17.2 G/DL (ref 13–17.7)
HGB UR QL STRIP.AUTO: NEGATIVE
HOLD SPECIMEN: NORMAL
IMM GRANULOCYTES # BLD AUTO: 0.05 10*3/MM3 (ref 0–0.05)
IMM GRANULOCYTES NFR BLD AUTO: 0.8 % (ref 0–0.5)
KETONES UR QL STRIP: ABNORMAL
LDLC SERPL CALC-MCNC: 146 MG/DL (ref 0–100)
LDLC/HDLC SERPL: 3.31 {RATIO}
LEUKOCYTE ESTERASE UR QL STRIP.AUTO: NEGATIVE
LYMPHOCYTES # BLD AUTO: 2.39 10*3/MM3 (ref 0.7–3.1)
LYMPHOCYTES NFR BLD AUTO: 40.3 % (ref 19.6–45.3)
MCH RBC QN AUTO: 31.2 PG (ref 26.6–33)
MCHC RBC AUTO-ENTMCNC: 34 G/DL (ref 31.5–35.7)
MCV RBC AUTO: 91.8 FL (ref 79–97)
MONOCYTES # BLD AUTO: 0.62 10*3/MM3 (ref 0.1–0.9)
MONOCYTES NFR BLD AUTO: 10.5 % (ref 5–12)
NEUTROPHILS NFR BLD AUTO: 2.57 10*3/MM3 (ref 1.7–7)
NEUTROPHILS NFR BLD AUTO: 43.4 % (ref 42.7–76)
NITRITE UR QL STRIP: NEGATIVE
NRBC BLD AUTO-RTO: 0 /100 WBC (ref 0–0.2)
PH UR STRIP.AUTO: 7.5 [PH] (ref 5–8)
PLATELET # BLD AUTO: 238 10*3/MM3 (ref 140–450)
PMV BLD AUTO: 11 FL (ref 6–12)
POTASSIUM SERPL-SCNC: 4.2 MMOL/L (ref 3.5–5.2)
PROT SERPL-MCNC: 7 G/DL (ref 6–8.5)
PROT UR QL STRIP: ABNORMAL
PSA SERPL-MCNC: 2.23 NG/ML (ref 0–4)
RBC # BLD AUTO: 5.51 10*6/MM3 (ref 4.14–5.8)
SODIUM SERPL-SCNC: 142 MMOL/L (ref 136–145)
SP GR UR STRIP: 1.02 (ref 1–1.03)
T4 FREE SERPL-MCNC: 1.32 NG/DL (ref 0.92–1.68)
TRIGL SERPL-MCNC: 239 MG/DL (ref 0–150)
TSH SERPL DL<=0.05 MIU/L-ACNC: 0.8 UIU/ML (ref 0.27–4.2)
URATE SERPL-MCNC: 6.6 MG/DL (ref 3.4–7)
UROBILINOGEN UR QL STRIP: ABNORMAL
VIT B12 BLD-MCNC: 350 PG/ML (ref 211–946)
VLDLC SERPL-MCNC: 44 MG/DL (ref 5–40)
WBC NRBC COR # BLD AUTO: 5.93 10*3/MM3 (ref 3.4–10.8)

## 2024-10-14 PROCEDURE — 82746 ASSAY OF FOLIC ACID SERUM: CPT

## 2024-10-14 PROCEDURE — 82607 VITAMIN B-12: CPT

## 2024-10-14 PROCEDURE — 84550 ASSAY OF BLOOD/URIC ACID: CPT

## 2024-10-14 PROCEDURE — 86140 C-REACTIVE PROTEIN: CPT

## 2024-10-14 PROCEDURE — 86431 RHEUMATOID FACTOR QUANT: CPT

## 2024-10-14 PROCEDURE — 80061 LIPID PANEL: CPT

## 2024-10-14 PROCEDURE — 36415 COLL VENOUS BLD VENIPUNCTURE: CPT

## 2024-10-14 PROCEDURE — 82306 VITAMIN D 25 HYDROXY: CPT

## 2024-10-14 PROCEDURE — 81003 URINALYSIS AUTO W/O SCOPE: CPT

## 2024-10-14 PROCEDURE — 80050 GENERAL HEALTH PANEL: CPT

## 2024-10-14 PROCEDURE — G0103 PSA SCREENING: HCPCS

## 2024-10-14 PROCEDURE — 84439 ASSAY OF FREE THYROXINE: CPT

## 2024-10-17 ENCOUNTER — OFFICE VISIT (OUTPATIENT)
Dept: INTERNAL MEDICINE | Age: 54
End: 2024-10-17
Payer: COMMERCIAL

## 2024-10-17 VITALS
SYSTOLIC BLOOD PRESSURE: 132 MMHG | HEIGHT: 68 IN | HEART RATE: 82 BPM | WEIGHT: 184.8 LBS | BODY MASS INDEX: 28.01 KG/M2 | TEMPERATURE: 98.4 F | DIASTOLIC BLOOD PRESSURE: 102 MMHG | OXYGEN SATURATION: 97 %

## 2024-10-17 DIAGNOSIS — R97.20 INCREASED PROSTATE SPECIFIC ANTIGEN (PSA) VELOCITY: ICD-10-CM

## 2024-10-17 DIAGNOSIS — E79.0 ELEVATED URIC ACID IN BLOOD: ICD-10-CM

## 2024-10-17 DIAGNOSIS — I10 ESSENTIAL HYPERTENSION: ICD-10-CM

## 2024-10-17 DIAGNOSIS — E78.2 MIXED HYPERLIPIDEMIA: ICD-10-CM

## 2024-10-17 DIAGNOSIS — L98.9 SCALP LESION: ICD-10-CM

## 2024-10-17 DIAGNOSIS — M79.642 CHRONIC PAIN OF LEFT HAND: ICD-10-CM

## 2024-10-17 DIAGNOSIS — D50.9 IRON DEFICIENCY ANEMIA, UNSPECIFIED IRON DEFICIENCY ANEMIA TYPE: ICD-10-CM

## 2024-10-17 DIAGNOSIS — G89.29 CHRONIC PAIN OF LEFT HAND: ICD-10-CM

## 2024-10-17 DIAGNOSIS — Z00.00 WELL ADULT HEALTH CHECK: Primary | ICD-10-CM

## 2024-10-17 DIAGNOSIS — E55.9 VITAMIN D DEFICIENCY: ICD-10-CM

## 2024-10-17 DIAGNOSIS — K22.719 BARRETT'S ESOPHAGUS WITH DYSPLASIA: ICD-10-CM

## 2024-10-17 LAB
CHROMATIN AB SERPL-ACNC: <10 IU/ML (ref 0–14)
CRP SERPL-MCNC: <0.3 MG/DL (ref 0–0.5)

## 2024-10-17 PROCEDURE — 99214 OFFICE O/P EST MOD 30 MIN: CPT | Performed by: INTERNAL MEDICINE

## 2024-10-17 PROCEDURE — 99396 PREV VISIT EST AGE 40-64: CPT | Performed by: INTERNAL MEDICINE

## 2024-10-17 RX ORDER — VALSARTAN 80 MG/1
80 TABLET ORAL DAILY
Qty: 90 TABLET | Refills: 1 | Status: SHIPPED | OUTPATIENT
Start: 2024-10-17

## 2024-10-17 NOTE — ASSESSMENT & PLAN NOTE
Vitamin D is down from 96 to 54 over the past year, he used to be on 5000 units over-the-counter, he can continue to hold this, will check it again in 6 months.

## 2024-10-17 NOTE — ASSESSMENT & PLAN NOTE
The 10-year ASCVD risk score (Andrea GALEANO, et al., 2019) is: 6.8%    Values used to calculate the score:      Age: 54 years      Sex: Male      Is Non- : No      Diabetic: No      Tobacco smoker: No      Systolic Blood Pressure: 132 mmHg      Is BP treated: No      HDL Cholesterol: 43 mg/dL      Total Cholesterol: 233 mg/dL     Patient is in borderline category in regards to treatment of his LDL.  We need to address his blood pressure presently, recommend he look at some of the fats in his diet, will need to consider statin on return to office if LDL same.

## 2024-10-17 NOTE — ASSESSMENT & PLAN NOTE
This is more of an issue as of his 10/24 OV.  Actually involving both hands, left is just the worst.  He is using 400 mg of Motrin as needed for this presently.    He had a elevated gout level previously, but it corrected without medication, at 6.6 as of his 10/24 OV.    We will get plain x-ray to see if they see any signs of gouty arthritis etc. and check a few extra inflammatory markers now and on return to office.

## 2024-10-17 NOTE — PROGRESS NOTES
"Chief Complaint  Annual Exam (Pt had labs. ) and Arthritis (Pt states that his hands are hurting him very bad. )    Subjective          Marcelino Ram presents to [facility]     Arthritis  Pertinent negatives include no dysuria, fatigue or fever.      History of present illness:  Patient is a 54-year-old male, former patient of Edwina Covarrubias, who I saw for the first time 8/21.  He has underlying hyperlipidemia, hypertension, as well as a history of Hicks's esophagus.  He is coming in now 10/24 for routine 6-month f/u.  I will review any labs that may have been performed for this visit and address any new concerns.    Review of Systems   Constitutional:  Negative for appetite change, fatigue and fever.   HENT:  Negative for congestion and ear pain.    Eyes:  Negative for blurred vision.   Respiratory:  Negative for cough, chest tightness, shortness of breath and wheezing.    Cardiovascular:  Negative for chest pain, palpitations and leg swelling.   Gastrointestinal:  Negative for abdominal pain.   Genitourinary:  Negative for difficulty urinating, dysuria and hematuria.   Musculoskeletal:  Positive for arthritis. Negative for arthralgias and gait problem.   Skin:  Negative for skin lesions.   Neurological:  Negative for syncope, memory problem and confusion.   Psychiatric/Behavioral:  Negative for self-injury and depressed mood.        Objective   Vital Signs:   BP (!) 132/102   Pulse 82   Temp 98.4 °F (36.9 °C) (Skin)   Ht 172.7 cm (67.99\")   Wt 83.8 kg (184 lb 12.8 oz)   SpO2 97%   BMI 28.11 kg/m²           Physical Exam  Vitals and nursing note reviewed.   Constitutional:       General: He is not in acute distress.     Appearance: Normal appearance. He is not toxic-appearing.   HENT:      Head: Atraumatic.      Right Ear: External ear normal.      Left Ear: External ear normal.      Nose: Nose normal.      Mouth/Throat:      Mouth: Mucous membranes are moist.   Eyes: "      General:         Right eye: No discharge.         Left eye: No discharge.      Extraocular Movements: Extraocular movements intact.      Pupils: Pupils are equal, round, and reactive to light.   Cardiovascular:      Rate and Rhythm: Normal rate and regular rhythm.      Pulses: Normal pulses.      Heart sounds: Normal heart sounds. No murmur heard.     No gallop.   Pulmonary:      Effort: Pulmonary effort is normal. No respiratory distress.      Breath sounds: No wheezing, rhonchi or rales.   Abdominal:      General: There is no distension.      Palpations: Abdomen is soft. There is no mass.      Tenderness: There is no abdominal tenderness. There is no guarding.   Musculoskeletal:         General: No swelling or tenderness.      Cervical back: No tenderness.      Right lower leg: No edema.      Left lower leg: No edema.   Skin:     General: Skin is warm and dry.      Findings: No rash.   Neurological:      General: No focal deficit present.      Mental Status: He is alert and oriented to person, place, and time. Mental status is at baseline.      Motor: No weakness.      Gait: Gait normal.   Psychiatric:         Mood and Affect: Mood normal.         Thought Content: Thought content normal.          Result Review :   The following data was reviewed by: Chris Jean MD on 08/18/2021:                  Assessment and Plan    Diagnoses and all orders for this visit:    1. Well adult health check (Primary)  Overview:  Preventive measures: Were reviewed with the patient at this office visit.  They included but were not limited to discussions in regards to vaccines outstanding, auto safety with seat belts and other assistive devices, fall prevention, and routine screening studies.    Exercise: No ischemia with routine yard work, golf/walking as of 10/24.  Comprehensive labs: We will review all with patient at 10/24 OV.    Covid vaccine: No longer desired.  Other vaccines: Ditto regarding flu shot 10/23.    PSA: 1.3  (10/20/2023)---> 2.2 (10/14/2024)  (Father +)  Colonoscopy: 7/19... negative... 10 years.    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      2. Hicks's esophagus with dysplasia  Overview:  EGD 9/24:  - A medium-sized hiatal hernia was present.   - There were esophageal mucosal changes secondary to established long-segment Hicks's disease present at the gastroesophageal junction. The maximum longitudinal extent of these mucosal changes was 4 cm in length. Mucosa was biopsied with a cold forceps for histology in 4 quadrants at intervals of 1 cm at the gastroesophageal junction. One specimen bottle was sent to pathology.   - Multiple small sessile polyps with no stigmata of recent bleeding were found in the gastric fundus.  - The examined duodenum was normal.  - Path = - Squamocolumnar mucosa with intestinal metaplasia                - Negative for dysplasia and malignancy     Assessment & Plan:  Patient status post recent EGD per Dr. Gray as of his 10/24 OV.  It was reviewed, results are as above.  He continues with just low-dose omeprazole.      3. Mixed hyperlipidemia  Assessment & Plan:  The 10-year ASCVD risk score (Andrea GALEANO, et al., 2019) is: 6.8%    Values used to calculate the score:      Age: 54 years      Sex: Male      Is Non- : No      Diabetic: No      Tobacco smoker: No      Systolic Blood Pressure: 132 mmHg      Is BP treated: No      HDL Cholesterol: 43 mg/dL      Total Cholesterol: 233 mg/dL     Patient is in borderline category in regards to treatment of his LDL.  We need to address his blood pressure presently, recommend he look at some of the fats in his diet, will need to consider statin on return to office if LDL same.    Orders:  -     Lipid Panel;  Future    4. Essential hypertension  Assessment & Plan:  Patient was previously on very low-dose metoprolol, we saw him ... Diastolic blood pressure was in the mid 80s.  He weaned off of this, but diastolic blood pressure has trended up into the high 90s, his systolic was high at the time of his EGD, that is understandable.    His resting heart rate is around 80, would likely be better served with ACEI versus ARB, will start low-dose valsartan, asked him to call with results and about a month if not well normal then.    Orders:  -     Comprehensive Metabolic Panel; Future    5. Elevated uric acid in blood    6. Chronic pain of left hand  Assessment & Plan:  This is more of an issue as of his 10/24 OV.  Actually involving both hands, left is just the worst.  He is using 400 mg of Motrin as needed for this presently.    He had a elevated gout level previously, but it corrected without medication, at 6.6 as of his 10/24 OV.    We will get plain x-ray to see if they see any signs of gouty arthritis etc. and check a few extra inflammatory markers now and on return to office.    Orders:  -     XR Hand 3+ View Left; Future  -     Sedimentation Rate; Future  -     C-reactive protein; Future  -     Uric Acid; Future  -     C-reactive protein; Future  -     Rheumatoid Factor; Future  -     HEATHER; Future    7. Vitamin D deficiency  Assessment & Plan:  Vitamin D is down from 96 to 54 over the past year, he used to be on 5000 units over-the-counter, he can continue to hold this, will check it again in 6 months.    Orders:  -     Vitamin D,25-Hydroxy; Future    8. Iron deficiency anemia, unspecified iron deficiency anemia type    9. Increased prostate specific antigen (PSA) velocity  -     PSA DIAGNOSTIC ONLY; Future    Other orders  -     valsartan (Diovan) 80 MG tablet; Take 1 tablet by mouth Daily.  Dispense: 90 tablet; Refill: 1           BMI is >= 25 and <30. (Overweight) The following options were offered after discussion;:  exercise counseling/recommendations and nutrition counseling/recommendations     --  --  Older notes:  VISIT 2/2/21 per Edwina:  Left shoulder pain  Abnl lesion on tongue - will show dentist again  Left knee pain  HTN   Diverticulosis with hx of diverticulitis  Vitamin D Deficiency  Hypertriglyceridemia - hasn't taken Fenofibrate x 1 month - has forgotten  Hicks's esophagus - on Protonix now, will repeat EGD in 1 year - due 7/2020  Scrotal Hydrocele/cysts - saw Dr. Berry, no further work up  Hiatal Hernia  --  --  Also sees: no one  --  --  Stress test: 7/2015 per Dr. Mejía - sending for another--->had this in 3/4/21  IMPRESSION:    1.  Normal myocardial SPECT perfusion study with exercise.    2.  No evidence of myocardial infarct or reversible ischemia.    3.  Normal left ventricular systolic function with ejection fraction of 65%      Colonoscopy: 7/2019 per Dr. 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  Patient was given instructions and counseling regarding his condition or for health maintenance advice. Please see specific information pulled into the AVS if appropriate.       Answers submitted by the patient for this visit:  Primary Reason for Visit (Submitted on 10/16/2023)  What is the primary reason for your visit?: Other  Other (Submitted on 10/16/2023)  Please describe your symptoms.: Feet are sore and fingers are stiff in the mornings, two of them lock up.  Also, left knee has given out on me a few times.  Have you had these symptoms before?: No  How long have you been having these symptoms?: Greater than 2 weeks  Please describe  any probable cause for these symptoms. : Arthritis?

## 2024-10-17 NOTE — ASSESSMENT & PLAN NOTE
Patient status post recent EGD per Dr. Gray as of his 10/24 OV.  It was reviewed, results are as above.  He continues with just low-dose omeprazole.

## 2024-10-17 NOTE — ASSESSMENT & PLAN NOTE
Patient was previously on very low-dose metoprolol, we saw him ... Diastolic blood pressure was in the mid 80s.  He weaned off of this, but diastolic blood pressure has trended up into the high 90s, his systolic was high at the time of his EGD, that is understandable.    His resting heart rate is around 80, would likely be better served with ACEI versus ARB, will start low-dose valsartan, asked him to call with results and about a month if not well normal then.

## 2025-04-18 ENCOUNTER — LAB (OUTPATIENT)
Dept: LAB | Facility: HOSPITAL | Age: 55
End: 2025-04-18
Payer: COMMERCIAL

## 2025-04-18 DIAGNOSIS — E78.2 MIXED HYPERLIPIDEMIA: ICD-10-CM

## 2025-04-18 DIAGNOSIS — R97.20 INCREASED PROSTATE SPECIFIC ANTIGEN (PSA) VELOCITY: ICD-10-CM

## 2025-04-18 DIAGNOSIS — E55.9 VITAMIN D DEFICIENCY: ICD-10-CM

## 2025-04-18 DIAGNOSIS — M79.642 CHRONIC PAIN OF LEFT HAND: ICD-10-CM

## 2025-04-18 DIAGNOSIS — G89.29 CHRONIC PAIN OF LEFT HAND: ICD-10-CM

## 2025-04-18 DIAGNOSIS — I10 ESSENTIAL HYPERTENSION: ICD-10-CM

## 2025-04-18 LAB
25(OH)D3 SERPL-MCNC: 56.1 NG/ML (ref 30–100)
ALBUMIN SERPL-MCNC: 4.6 G/DL (ref 3.5–5.2)
ALBUMIN/GLOB SERPL: 1.6 G/DL
ALP SERPL-CCNC: 69 U/L (ref 39–117)
ALT SERPL W P-5'-P-CCNC: 17 U/L (ref 1–41)
ANION GAP SERPL CALCULATED.3IONS-SCNC: 14 MMOL/L (ref 5–15)
AST SERPL-CCNC: 26 U/L (ref 1–40)
BILIRUB SERPL-MCNC: 1.3 MG/DL (ref 0–1.2)
BUN SERPL-MCNC: 13 MG/DL (ref 6–20)
BUN/CREAT SERPL: 12.6 (ref 7–25)
CALCIUM SPEC-SCNC: 9.6 MG/DL (ref 8.6–10.5)
CHLORIDE SERPL-SCNC: 102 MMOL/L (ref 98–107)
CHOLEST SERPL-MCNC: 210 MG/DL (ref 0–200)
CO2 SERPL-SCNC: 25 MMOL/L (ref 22–29)
CREAT SERPL-MCNC: 1.03 MG/DL (ref 0.76–1.27)
CRP SERPL-MCNC: <0.3 MG/DL (ref 0–0.5)
EGFRCR SERPLBLD CKD-EPI 2021: 86.9 ML/MIN/1.73
ERYTHROCYTE [SEDIMENTATION RATE] IN BLOOD: <1 MM/HR (ref 0–20)
GLOBULIN UR ELPH-MCNC: 2.9 GM/DL
GLUCOSE SERPL-MCNC: 79 MG/DL (ref 65–99)
HDLC SERPL-MCNC: 49 MG/DL (ref 40–60)
LDLC SERPL CALC-MCNC: 131 MG/DL (ref 0–100)
LDLC/HDLC SERPL: 2.61 {RATIO}
POTASSIUM SERPL-SCNC: 4.7 MMOL/L (ref 3.5–5.2)
PROT SERPL-MCNC: 7.5 G/DL (ref 6–8.5)
PSA SERPL-MCNC: 2.89 NG/ML (ref 0–4)
SODIUM SERPL-SCNC: 141 MMOL/L (ref 136–145)
TRIGL SERPL-MCNC: 166 MG/DL (ref 0–150)
URATE SERPL-MCNC: 7.9 MG/DL (ref 3.4–7)
VLDLC SERPL-MCNC: 30 MG/DL (ref 5–40)

## 2025-04-18 PROCEDURE — 80053 COMPREHEN METABOLIC PANEL: CPT

## 2025-04-18 PROCEDURE — 84550 ASSAY OF BLOOD/URIC ACID: CPT

## 2025-04-18 PROCEDURE — 85652 RBC SED RATE AUTOMATED: CPT

## 2025-04-18 PROCEDURE — 80061 LIPID PANEL: CPT

## 2025-04-18 PROCEDURE — 86038 ANTINUCLEAR ANTIBODIES: CPT

## 2025-04-18 PROCEDURE — 86140 C-REACTIVE PROTEIN: CPT

## 2025-04-18 PROCEDURE — 84153 ASSAY OF PSA TOTAL: CPT

## 2025-04-18 PROCEDURE — 36415 COLL VENOUS BLD VENIPUNCTURE: CPT

## 2025-04-18 PROCEDURE — 82306 VITAMIN D 25 HYDROXY: CPT

## 2025-04-21 LAB — ANA SER QL: NEGATIVE

## 2025-04-22 ENCOUNTER — OFFICE VISIT (OUTPATIENT)
Age: 55
End: 2025-04-22
Payer: COMMERCIAL

## 2025-04-22 VITALS
WEIGHT: 186.2 LBS | BODY MASS INDEX: 28.22 KG/M2 | SYSTOLIC BLOOD PRESSURE: 140 MMHG | HEART RATE: 78 BPM | OXYGEN SATURATION: 98 % | HEIGHT: 68 IN | DIASTOLIC BLOOD PRESSURE: 90 MMHG

## 2025-04-22 DIAGNOSIS — E55.9 VITAMIN D DEFICIENCY: ICD-10-CM

## 2025-04-22 DIAGNOSIS — Z00.00 WELL ADULT EXAM: ICD-10-CM

## 2025-04-22 DIAGNOSIS — I10 ESSENTIAL HYPERTENSION: ICD-10-CM

## 2025-04-22 DIAGNOSIS — Z12.5 PROSTATE CANCER SCREENING: ICD-10-CM

## 2025-04-22 DIAGNOSIS — E79.0 ELEVATED URIC ACID IN BLOOD: ICD-10-CM

## 2025-04-22 DIAGNOSIS — E78.2 MIXED HYPERLIPIDEMIA: Primary | ICD-10-CM

## 2025-04-22 DIAGNOSIS — R97.20 INCREASED PROSTATE SPECIFIC ANTIGEN (PSA) VELOCITY: ICD-10-CM

## 2025-04-22 DIAGNOSIS — M79.641 PAIN IN BOTH HANDS: ICD-10-CM

## 2025-04-22 DIAGNOSIS — M79.642 PAIN IN BOTH HANDS: ICD-10-CM

## 2025-04-22 RX ORDER — VALSARTAN 160 MG/1
160 TABLET ORAL DAILY
Qty: 90 TABLET | Refills: 1 | Status: SHIPPED | OUTPATIENT
Start: 2025-04-22

## 2025-04-22 NOTE — PROGRESS NOTES
"Chief Complaint  Hyperlipidemia, Follow-up (Pt had labs, he states that this is routine.), and right hand trigger finger (Pt states that he had injections a couple reed ago with Dr. Elliott./He states that bilateral hands are stiff and sore in the mornings. )    Subjective          Marcelino Ram presents to [facility]     Arthritis  Pertinent negatives include no dysuria, fatigue or fever.   Hyperlipidemia  Pertinent negatives include no chest pain or shortness of breath.      History of present illness:  Patient is a 54-year-old male, former patient of Edwina Covarrubias, who I saw for the first time 8/21.  He has underlying hyperlipidemia, hypertension, as well as a history of Hicks's esophagus.  He is coming in now 4/25 for routine 6-month f/u.  I will review any labs that may have been performed for this visit and address any new concerns.    Review of Systems   Constitutional:  Negative for appetite change, fatigue and fever.   HENT:  Negative for congestion and ear pain.    Eyes:  Negative for blurred vision.   Respiratory:  Negative for cough, chest tightness, shortness of breath and wheezing.    Cardiovascular:  Negative for chest pain, palpitations and leg swelling.   Gastrointestinal:  Negative for abdominal pain.   Genitourinary:  Negative for difficulty urinating, dysuria and hematuria.   Musculoskeletal:  Negative for arthralgias and gait problem.   Skin:  Negative for skin lesions.   Neurological:  Negative for syncope, memory problem and confusion.   Psychiatric/Behavioral:  Negative for self-injury and depressed mood.        Objective   Vital Signs:   /90   Pulse 78   Ht 172.7 cm (67.99\")   Wt 84.5 kg (186 lb 3.2 oz)   SpO2 98%   BMI 28.32 kg/m²           Physical Exam  Vitals and nursing note reviewed.   Constitutional:       General: He is not in acute distress.     Appearance: Normal appearance. He is not toxic-appearing.   HENT:      Head: " Atraumatic.      Right Ear: External ear normal.      Left Ear: External ear normal.      Nose: Nose normal.      Mouth/Throat:      Mouth: Mucous membranes are moist.   Eyes:      General:         Right eye: No discharge.         Left eye: No discharge.      Extraocular Movements: Extraocular movements intact.      Pupils: Pupils are equal, round, and reactive to light.   Cardiovascular:      Rate and Rhythm: Normal rate and regular rhythm.      Pulses: Normal pulses.      Heart sounds: Normal heart sounds. No murmur heard.     No gallop.   Pulmonary:      Effort: Pulmonary effort is normal. No respiratory distress.      Breath sounds: No wheezing, rhonchi or rales.   Abdominal:      General: There is no distension.      Palpations: Abdomen is soft. There is no mass.      Tenderness: There is no abdominal tenderness. There is no guarding.   Musculoskeletal:         General: No swelling or tenderness.      Cervical back: No tenderness.      Right lower leg: No edema.      Left lower leg: No edema.   Skin:     General: Skin is warm and dry.      Findings: No rash.   Neurological:      General: No focal deficit present.      Mental Status: He is alert and oriented to person, place, and time. Mental status is at baseline.      Motor: No weakness.      Gait: Gait normal.   Psychiatric:         Mood and Affect: Mood normal.         Thought Content: Thought content normal.          Result Review :   The following data was reviewed by: Chris Jean MD on 08/18/2021:                  Assessment and Plan    Diagnoses and all orders for this visit:    1. Mixed hyperlipidemia (Primary)  Assessment & Plan:   The 10-year ASCVD risk score (Andrea GALEANO, et al., 2019) is: 6.9%    Values used to calculate the score:      Age: 54 years      Sex: Male      Is Non- : No      Diabetic: No      Tobacco smoker: No      Systolic Blood Pressure: 140 mmHg      Is BP treated: Yes      HDL Cholesterol: 49 mg/dL       Total Cholesterol: 210 mg/dL     ---> LDL is a little better at 130 as of his 4/25 OV.  His blood pressure is improving as well.  He still in the borderline category as far as risk factor ratio noted above, so we will continue with conservative treatment, dietary/exercise/ETC.    Orders:  -     Lipid panel; Future    2. Essential hypertension  Assessment & Plan:  Patient's blood pressure is improved but certainly not optimal as of his 4/25 OV.  We started him on low-dose valsartan last time, he does not have any swelling, so rather than adding diuretic, we will titrate the valsartan.    Orders:  -     Comprehensive metabolic panel; Future  -     CBC w AUTO Differential; Future    3. Elevated uric acid in blood  Assessment & Plan:  Patient still having big issues with this as of his 4/25 OV.  We repeated his inflammatory markers, sed rate and CRP are still negative.  His HEATHER and rheumatoid factor were negative as well.    We will get x-ray of the right hand since that is the one that is most effective, also the one that he had trigger finger injections for which he would like to avoid going forward.    Based on x-ray findings we should be able to refer him to hand clinic for initial evaluation and/or to rheumatology.    Also of note his uric acid is still mildly elevated at 7.9, but patient has not had a true gout attack, and his symptoms were no better on low-dose allopurinol.    Orders:  -     Uric acid; Future    4. Pain in both hands  Assessment & Plan:  Patient still having big issues with this as of his 4/25 OV.  We repeated his inflammatory markers, sed rate and CRP are still negative.  His HEATHER and rheumatoid factor were negative as well.    We will get x-ray of the right hand since that is the one that is most effective, also the one that he had trigger finger injections for which he would like to avoid going forward.    Based on x-ray findings we should be able to refer him to hand clinic for initial  evaluation and/or to rheumatology.    Also of note his uric acid is still mildly elevated at 7.9, but patient has not had a true gout attack, and his symptoms were no better on low-dose allopurinol.    Orders:  -     XR Hand 3+ View Right; Future    5. Vitamin D deficiency  Assessment & Plan:  Vitamin D is stable at 56 as of his 4/25 OV, he was almost to 100 when he was on the 5000 daily, he is just taking it intermittently presently and that certainly fine.    Orders:  -     Vitamin D,25-Hydroxy; Future    6. Increased prostate specific antigen (PSA) velocity  Assessment & Plan:  His PSA is continuing to trend up on about the same level, he is at 2.9 presently, so we will put in referral to see if urology is able to obtain imaging etc given his family history.    Orders:  -     Ambulatory Referral to Urology    7. Well adult exam  -     TSH+Free T4; Future    8. Prostate cancer screening  -     PSA SCREENING; Future    Other orders  -     valsartan (Diovan) 160 MG tablet; Take 1 tablet by mouth Daily.  Dispense: 90 tablet; Refill: 1           BMI is >= 25 and <30. (Overweight) The following options were offered after discussion;: exercise counseling/recommendations and nutrition counseling/recommendations     --  --  Older notes:  VISIT 2/2/21 per Edwina:  Left shoulder pain  Abnl lesion on tongue - will show dentist again  Left knee pain  HTN   Diverticulosis with hx of diverticulitis  Vitamin D Deficiency  Hypertriglyceridemia - hasn't taken Fenofibrate x 1 month - has forgotten  Hicks's esophagus - on Protonix now, will repeat EGD in 1 year - due 7/2020  Scrotal Hydrocele/cysts - saw Dr. Berry, no further work up  Hiatal Hernia  --  --  Also sees: no one  --  --  Stress test: 7/2015 per Dr. Mejía - sending for another--->had this in 3/4/21  IMPRESSION:    1.  Normal myocardial SPECT perfusion study with exercise.    2.  No evidence of myocardial infarct or reversible ischemia.    3.  Normal left ventricular  systolic function with ejection fraction of 65%      Colonoscopy: 7/2019 per Dr. 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  Patient was given instructions and counseling regarding his condition or for health maintenance advice. Please see specific information pulled into the AVS if appropriate.

## 2025-05-27 NOTE — PROGRESS NOTES
Chief Complaint: increasing PSA levels    Patient or patient representative verbalized consent for the use of Ambient Listening during the visit with  ISSAC Gates for chart documentation. 5/28/2025  08:41 EDT    Subjective         History of Present Illness  Marcelino Ram is a 55 y.o. male presents to [facility] to be seen for increased PSA.    He has not previously consulted with a urologist. His primary concern today is his Prostate-Specific Antigen (PSA) levels, which have been increasing over the past year, although they remain within the normal range. His most recent PSA test was conducted in mid-April 2025, yielding a result of 2.89. He reports no changes in urinary symptoms, including dysuria or hematuria. He also reports no pain in the perineal region, specifically between the scrotum and rectum. He does not experience increased urinary frequency, but occasionally needs to urinate at night. He experiences urgency during urination but has no issues with incontinence or incomplete bladder emptying.  He has never been diagnosed or treated for BPH.  He reports that his urinary symptoms are not bothersome enough to necessitate medication for BPH at this time.  He has no history of nephrolithiasis or urological surgeries.      Supplemental Information  He takes Diovan for high blood pressure, Prilosec for reflux, and cetirizine for allergies. He has Hicks's esophagus.    SOCIAL HISTORY  He does not use tobacco.    FAMILY HISTORY  His father was diagnosed with prostate cancer at the age of 78.    MEDICATIONS  Diovan, Prilosec, cetirizine.    Urinary symptoms/history:  Frequency-denies     Urgency-admits     Incontinence-denies     Nocturia-admits, 1 X per night     Dysuria-denies     Perineal pain-denies     Stream-stop and go urination     GH-denies     History of stones-denies      surgeries-denies     Family history of  malignancy-father diagnosed with prostate cancer at age  78     Cardiopulmonary-HTN    Anticoagulants-none     Smoker-denies     PSA  4/18/2025 2.89   10/14/2024 2.23  10/20/2023 1.28  3/2/2022 1.35  2/2/2021 1.78  1/30/2020 1.60    Objective     Past Medical History:   Diagnosis Date    Angioneurotic edema, initial encounter     Ankle sprain     Anxiety disorder, unspecified     APC (adenomatous polyposis coli)     Arthritis     Morales esophagus     Diverticulitis of colon     Diverticulosis     Esophageal reflux     Essential (primary) hypertension     Fracture of nasal bones 1986    Fracture of unsp metatarsal bone(s), left foot, init     Fracture of wrist     Fracture, finger     GERD (gastroesophageal reflux disease)     HL (hearing loss)     Hyperlipidemia, unspecified     Hypertension     Knee sprain     Low back strain     Neural foraminal stenosis of cervical spine     Osgood-Schlatter's disease     Other somatoform disorders     PAC (premature atrial contraction)     dr. gutierrez following    Palpitations     Tendinitis of knee     Tennis elbow     Tinnitus 2020    post covid    Vitamin D deficiency, unspecified     Wrist sprain        Past Surgical History:   Procedure Laterality Date    COLONOSCOPY  07/2019    ENDOSCOPIC FUNCTIONAL SINUS SURGERY (FESS) N/A 04/25/2022    Procedure: ENDOSCOPIC FUNCTIONAL SINUS SURGERY;  Surgeon: Segundo Ott MD;  Location: [facility] OR [facility];  Service: ENT;  Laterality: N/A;    ENDOSCOPIC FUNCTIONAL SINUS SURGERY (FESS)  04/25/2022    Procedure: ;  Surgeon: Segundo Ott MD;  Location: [facility] OR [facility];  Service: ENT;;    ENDOSCOPY N/A 02/02/2022    Procedure: ESOPHAGOGASTRODUODENOSCOPY WITH BX;  Surgeon: Marcelino Gray MD;  Location: [facility] ENDOSCOPY;  Service: Gastroenterology;  Laterality: N/A;  HIATAL HERNIA, FUNDIC GLAND POLYPS, MORALES'S ESOPHAGUS    ENDOSCOPY N/A 9/4/2024    Procedure: ESOPHAGOGASTRODUODENOSCOPY WITH BIOPSIES;  Surgeon: Marcelino Gray MD;  Location: [facility] ENDOSCOPY;  Service:  "Gastroenterology;  Laterality: N/A;  HIATAL HERNIA, GASTRIC POLYPS, MORALES'S ESOPHAGUS    KNEE ARTHROSCOPY Right     NOSE SURGERY      NOSE FRACTURE    TONSILLECTOMY      TONSILLECTOMY AND ADENOIDECTOMY N/A 04/25/2022    Procedure: TONSILLECTOMY AND ADENOIDECTOMY, endoscopic sinus surgery with polypectomy, excision of tongue lesion, direct laryngoscopy, esophagoscopy, bronchoscopy;  Surgeon: Segundo Ott MD;  Location: [facility] OR [facility];  Service: ENT;  Laterality: N/A;    UPPER GASTROINTESTINAL ENDOSCOPY           Current Outpatient Medications:     omeprazole (priLOSEC) 20 MG capsule, Take 1 capsule by mouth Daily., Disp: , Rfl:     valsartan (Diovan) 160 MG tablet, Take 1 tablet by mouth Daily., Disp: 90 tablet, Rfl: 1    vitamin C (ASCORBIC ACID) 250 MG tablet, Take 1 tablet by mouth Daily., Disp: , Rfl:     No Known Allergies     Family History   Problem Relation Age of Onset    Hypertension Mother     Cancer Father     Heart disease Sister     Stroke Sister     Heart disease Brother     Colon cancer Neg Hx        Social History     Socioeconomic History    Marital status:    Tobacco Use    Smoking status: Never     Passive exposure: Past    Smokeless tobacco: Never   Vaping Use    Vaping status: Never Used   Substance and Sexual Activity    Alcohol use: Yes     Alcohol/week: 16.0 standard drinks of alcohol     Types: 12 Cans of beer, 4 Shots of liquor per week     Comment: occasional    Drug use: Never    Sexual activity: Yes     Partners: Female       Vital Signs:   Resp 18   Ht 172.7 cm (68\")   Wt 83.9 kg (185 lb)   BMI 28.13 kg/m²      Physical Exam  Vitals and nursing note reviewed.   Constitutional:       General: He is not in acute distress.     Appearance: Normal appearance. He is not toxic-appearing.   Pulmonary:      Effort: Pulmonary effort is normal. No respiratory distress.   Neurological:      General: No focal deficit present.      Mental Status: He is alert and oriented to person, " place, and time.          Result Review :   The following data was reviewed by: ISSAC Gates on 05/28/2025:  Results for orders placed or performed in visit on 05/28/25   Bladder Scan    Collection Time: 05/28/25  8:30 AM   Result Value Ref Range    Volume 0 ML         PSA          10/14/2024    09:10 4/18/2025    11:26   PSA   PSA 2.230  2.890    Bladder Scan interpretation 05/28/2025    Estimation of residual urine via BVI 3000 Verathon Bladder Scan  MA/nurse performing: LISA Ragland  Residual Urine: 0 ml  Indication: Increased prostate specific antigen (PSA) velocity    Family history of prostate cancer    Urinary urgency   Position: Supine  Examination: Incremental scanning of the suprapubic area using 2.0 MHz transducer using copious amounts of acoustic gel.   Findings: An anechoic area was demonstrated which represented the bladder, with measurement of residual urine as noted. I inspected this myself. In that the residual urine was stable or insignificant, refer to plan for treatment and plan necessary at this time.         Results  Laboratory Studies  PSA was 2.23 on 10/14/2024 and 2.89 on 04/18/2025.        Procedures        Assessment and Plan    Diagnoses and all orders for this visit:    1. Increased prostate specific antigen (PSA) velocity (Primary)  -     Bladder Scan  -     PSA DIAGNOSTIC; Future    2. Family history of prostate cancer    3. Urinary urgency        Assessment & Plan  1. Elevated Prostate-Specific Antigen (PSA) levels.  His PSA levels have been progressively increasing, with the most recent reading at 2.89 on 04/18/2025. He reports experiencing urinary urgency and intermittent flow but does not have any difficulty in completely emptying his bladder.  An abdominal scan conducted today post-urination revealed no residual urine, which is a positive sign. He does not exhibit any definitive symptoms indicative of a prostate infection. The potential causes of elevated PSA levels were  discussed, including, but not limited to prostate malignancy, benign prostatic hyperplasia, prostate infections, and trauma to the prostate. The possibility of initiating antibiotic therapy was considered but deemed unnecessary due to the absence of significant symptoms. A re-evaluation of his PSA levels will be conducted in approximately 3 months from the date of his last test, which would be towards the end of July 2025. An exosome test will also be ordered to provide additional information regarding potential risk of clinically significant prostate malignancy. He has been advised to monitor his symptoms closely and report any changes such as increased frequency, urgency, pain, or burning during urination.    Follow-up  The patient will follow up in 3 months with PSA prior.      Follow Up   Return in about 3 months (around 8/28/2025) for with PSA prior .  Patient was given instructions and counseling regarding his condition or for health maintenance advice. Please see specific information pulled into the AVS if appropriate.         This document has been electronically signed by ISSAC Gates  May 28, 2025 09:10 EDT

## 2025-05-28 ENCOUNTER — OFFICE VISIT (OUTPATIENT)
Dept: UROLOGY | Age: 55
End: 2025-05-28
Payer: COMMERCIAL

## 2025-05-28 VITALS — WEIGHT: 185 LBS | RESPIRATION RATE: 18 BRPM | BODY MASS INDEX: 28.04 KG/M2 | HEIGHT: 68 IN

## 2025-05-28 DIAGNOSIS — Z80.42 FAMILY HISTORY OF PROSTATE CANCER: ICD-10-CM

## 2025-05-28 DIAGNOSIS — R97.20 INCREASED PROSTATE SPECIFIC ANTIGEN (PSA) VELOCITY: Primary | ICD-10-CM

## 2025-05-28 DIAGNOSIS — R39.15 URINARY URGENCY: ICD-10-CM

## 2025-05-28 LAB — SPECIMEN VOL 24H UR: NORMAL L

## 2025-06-09 ENCOUNTER — RESULTS FOLLOW-UP (OUTPATIENT)
Dept: UROLOGY | Age: 55
End: 2025-06-09
Payer: COMMERCIAL

## 2025-06-09 NOTE — TELEPHONE ENCOUNTER
Called patient and reviewed results of exosome testing.  The patient's Score was 12.97 which indicates a very low risk of prostate cancer.  Although this test is very reassuring, we are still going to watch his PSA closely with repeat PSA before his follow-up appointment in August.  The patient verbalizes understanding and agrees with plan of care.

## 2025-08-20 DIAGNOSIS — R97.20 INCREASED PROSTATE SPECIFIC ANTIGEN (PSA) VELOCITY: Primary | ICD-10-CM

## 2025-08-22 ENCOUNTER — LAB (OUTPATIENT)
Dept: LAB | Facility: HOSPITAL | Age: 55
End: 2025-08-22
Payer: COMMERCIAL

## 2025-08-29 ENCOUNTER — OFFICE VISIT (OUTPATIENT)
Dept: UROLOGY | Age: 55
End: 2025-08-29
Payer: COMMERCIAL

## 2025-08-29 VITALS — BODY MASS INDEX: 28.04 KG/M2 | HEIGHT: 68 IN | WEIGHT: 185 LBS | RESPIRATION RATE: 12 BRPM

## 2025-08-29 DIAGNOSIS — R39.15 URINARY URGENCY: ICD-10-CM

## 2025-08-29 DIAGNOSIS — Z80.42 FAMILY HISTORY OF PROSTATE CANCER: ICD-10-CM

## 2025-08-29 DIAGNOSIS — R97.20 INCREASED PROSTATE SPECIFIC ANTIGEN (PSA) VELOCITY: Primary | ICD-10-CM

## 2025-08-29 DIAGNOSIS — N40.1 BENIGN PROSTATIC HYPERPLASIA WITH LOWER URINARY TRACT SYMPTOMS, SYMPTOM DETAILS UNSPECIFIED: ICD-10-CM

## 2025-08-29 LAB — URINE VOLUME: 0

## 2025-08-29 PROCEDURE — 99213 OFFICE O/P EST LOW 20 MIN: CPT | Performed by: NURSE PRACTITIONER

## 2025-08-29 RX ORDER — CETIRIZINE HYDROCHLORIDE 10 MG/1
TABLET ORAL
COMMUNITY
Start: 2025-05-01

## (undated) DEVICE — SYR LUERLOK 30CC

## (undated) DEVICE — PAD,EYE,1-5/8X2 5/8,STERILE,LF,1/PK: Brand: MEDLINE

## (undated) DEVICE — PENCL E/S HNDSWCH ROCKR CB

## (undated) DEVICE — Device

## (undated) DEVICE — NON-ADHERENT PADS PREPACK: Brand: TELFA

## (undated) DEVICE — BLADE 1884004HR TRICUT 5PK M4 4MM ROTATE: Brand: TRICUT

## (undated) DEVICE — TOWEL,OR,DSP,ST,BLUE,STD,4/PK,20PK/CS: Brand: MEDLINE

## (undated) DEVICE — MEDI-VAC NON-CONDUCTIVE SUCTION TUBING: Brand: CARDINAL HEALTH

## (undated) DEVICE — Device: Brand: DEFENDO AIR/WATER/SUCTION AND BIOPSY VALVE

## (undated) DEVICE — CONN JET HYDRA H20 AUXILIARY DISP

## (undated) DEVICE — KT ANTI FOG W/FLD AND SPNG

## (undated) DEVICE — TUBING 1895522 5PK STRAIGHTSHOT TO XPS: Brand: STRAIGHTSHOT®

## (undated) DEVICE — SOL IRRG H2O PL/BG 1000ML STRL

## (undated) DEVICE — EGD OR ERCP KIT: Brand: MEDLINE INDUSTRIES, INC.

## (undated) DEVICE — LINER SURG CANSTR SXN S/RIGD 1500CC

## (undated) DEVICE — SOLIDIFIER LIQLOC PLS 1500CC BT

## (undated) DEVICE — SINGLE-USE BIOPSY FORCEPS: Brand: RADIAL JAW 4

## (undated) DEVICE — BLCK/BITE BLOX WO/DENTL/RIM W/STRAP 54F

## (undated) DEVICE — ELECTRD BLD EDGE/INSUL1P 2.4X5.1MM STRL

## (undated) DEVICE — STANDARD HYPODERMIC NEEDLE,POLYPROPYLENE HUB: Brand: MONOJECT

## (undated) DEVICE — CODMAN® SURGICAL PATTIES 1/2" X 3" (1.27CM X 7.62CM): Brand: CODMAN®

## (undated) DEVICE — ENT-LF: Brand: MEDLINE INDUSTRIES, INC.

## (undated) DEVICE — T AND A PACK: Brand: MEDLINE INDUSTRIES, INC.

## (undated) DEVICE — MINOR-LF: Brand: MEDLINE INDUSTRIES, INC.

## (undated) DEVICE — SLV SCD KN/LEN ADJ EXPRSS BLENDED MD 1P/U

## (undated) DEVICE — CATH URETH AP 10F

## (undated) DEVICE — GLV SURG BIOGEL LTX PF 8 1/2

## (undated) DEVICE — BLADE 1884507 RADENOID 5PK ADULT 4.5MM: Brand: RAD®